# Patient Record
Sex: MALE | Race: WHITE | Employment: FULL TIME | URBAN - METROPOLITAN AREA
[De-identification: names, ages, dates, MRNs, and addresses within clinical notes are randomized per-mention and may not be internally consistent; named-entity substitution may affect disease eponyms.]

---

## 2019-09-30 ENCOUNTER — OFFICE VISIT (OUTPATIENT)
Dept: FAMILY MEDICINE CLINIC | Age: 69
End: 2019-09-30
Payer: COMMERCIAL

## 2019-09-30 VITALS
WEIGHT: 233.8 LBS | HEIGHT: 70 IN | BODY MASS INDEX: 33.47 KG/M2 | OXYGEN SATURATION: 95 % | RESPIRATION RATE: 14 BRPM | DIASTOLIC BLOOD PRESSURE: 88 MMHG | SYSTOLIC BLOOD PRESSURE: 132 MMHG | HEART RATE: 94 BPM | TEMPERATURE: 98 F

## 2019-09-30 DIAGNOSIS — Z00.00 PREVENTATIVE HEALTH CARE: Primary | ICD-10-CM

## 2019-09-30 DIAGNOSIS — K64.9 BLEEDING HEMORRHOID: ICD-10-CM

## 2019-09-30 DIAGNOSIS — E34.9 TESTOSTERONE DEFICIENCY: ICD-10-CM

## 2019-09-30 DIAGNOSIS — Z00.00 HEALTH CARE MAINTENANCE: ICD-10-CM

## 2019-09-30 DIAGNOSIS — Z12.5 PROSTATE CANCER SCREENING: ICD-10-CM

## 2019-09-30 DIAGNOSIS — E78.00 PURE HYPERCHOLESTEROLEMIA: ICD-10-CM

## 2019-09-30 DIAGNOSIS — N52.9 ERECTILE DYSFUNCTION, UNSPECIFIED ERECTILE DYSFUNCTION TYPE: ICD-10-CM

## 2019-09-30 DIAGNOSIS — Z12.11 COLON CANCER SCREENING: ICD-10-CM

## 2019-09-30 DIAGNOSIS — R73.9 HYPERGLYCEMIA: ICD-10-CM

## 2019-09-30 DIAGNOSIS — Z11.59 ENCOUNTER FOR HEPATITIS C SCREENING TEST FOR LOW RISK PATIENT: ICD-10-CM

## 2019-09-30 PROCEDURE — 99202 OFFICE O/P NEW SF 15 MIN: CPT | Performed by: INTERNAL MEDICINE

## 2019-09-30 RX ORDER — HYDROCORTISONE ACETATE 25 MG/1
25 SUPPOSITORY RECTAL 2 TIMES DAILY PRN
Qty: 12 SUPPOSITORY | Refills: 3 | Status: SHIPPED | OUTPATIENT
Start: 2019-09-30 | End: 2020-09-29

## 2019-09-30 RX ORDER — SILDENAFIL 100 MG/1
100 TABLET, FILM COATED ORAL PRN
Qty: 30 TABLET | Refills: 11 | Status: SHIPPED | OUTPATIENT
Start: 2019-09-30 | End: 2020-09-29

## 2019-09-30 ASSESSMENT — ENCOUNTER SYMPTOMS
TROUBLE SWALLOWING: 0
NAUSEA: 0
CHOKING: 0
VOMITING: 0
SHORTNESS OF BREATH: 0
PHOTOPHOBIA: 0
VOICE CHANGE: 0

## 2019-09-30 ASSESSMENT — PATIENT HEALTH QUESTIONNAIRE - PHQ9
SUM OF ALL RESPONSES TO PHQ9 QUESTIONS 1 & 2: 0
SUM OF ALL RESPONSES TO PHQ QUESTIONS 1-9: 0
1. LITTLE INTEREST OR PLEASURE IN DOING THINGS: 0
2. FEELING DOWN, DEPRESSED OR HOPELESS: 0
SUM OF ALL RESPONSES TO PHQ QUESTIONS 1-9: 0

## 2019-10-05 DIAGNOSIS — E34.9 TESTOSTERONE DEFICIENCY: ICD-10-CM

## 2019-10-05 DIAGNOSIS — R73.9 HYPERGLYCEMIA: ICD-10-CM

## 2019-10-05 DIAGNOSIS — E78.00 PURE HYPERCHOLESTEROLEMIA: ICD-10-CM

## 2019-10-05 DIAGNOSIS — Z11.59 ENCOUNTER FOR HEPATITIS C SCREENING TEST FOR LOW RISK PATIENT: ICD-10-CM

## 2019-10-05 DIAGNOSIS — Z12.5 PROSTATE CANCER SCREENING: ICD-10-CM

## 2019-10-05 DIAGNOSIS — Z00.00 PREVENTATIVE HEALTH CARE: ICD-10-CM

## 2019-10-05 LAB
ALBUMIN SERPL-MCNC: 4.5 G/DL (ref 3.5–4.6)
ALP BLD-CCNC: 81 U/L (ref 35–104)
ALT SERPL-CCNC: 35 U/L (ref 0–41)
ANION GAP SERPL CALCULATED.3IONS-SCNC: 16 MEQ/L (ref 9–15)
AST SERPL-CCNC: 22 U/L (ref 0–40)
BASOPHILS ABSOLUTE: 0 K/UL (ref 0–0.2)
BASOPHILS RELATIVE PERCENT: 0.3 %
BILIRUB SERPL-MCNC: 0.7 MG/DL (ref 0.2–0.7)
BUN BLDV-MCNC: 13 MG/DL (ref 8–23)
CALCIUM SERPL-MCNC: 10.1 MG/DL (ref 8.5–9.9)
CHLORIDE BLD-SCNC: 102 MEQ/L (ref 95–107)
CHOLESTEROL, TOTAL: 229 MG/DL (ref 0–199)
CO2: 21 MEQ/L (ref 20–31)
CREAT SERPL-MCNC: 0.87 MG/DL (ref 0.7–1.2)
EOSINOPHILS ABSOLUTE: 0.1 K/UL (ref 0–0.7)
EOSINOPHILS RELATIVE PERCENT: 2 %
GFR AFRICAN AMERICAN: >60
GFR NON-AFRICAN AMERICAN: >60
GLOBULIN: 3.3 G/DL (ref 2.3–3.5)
GLUCOSE BLD-MCNC: 211 MG/DL (ref 70–99)
HBA1C MFR BLD: 8.7 % (ref 4.8–5.9)
HCT VFR BLD CALC: 46.6 % (ref 42–52)
HDLC SERPL-MCNC: 36 MG/DL (ref 40–59)
HEMOGLOBIN: 17.3 G/DL (ref 14–18)
HEPATITIS C ANTIBODY INTERPRETATION: NORMAL
LDL CHOLESTEROL CALCULATED: 139 MG/DL (ref 0–129)
LYMPHOCYTES ABSOLUTE: 1.7 K/UL (ref 1–4.8)
LYMPHOCYTES RELATIVE PERCENT: 24.8 %
MCH RBC QN AUTO: 34.6 PG (ref 27–31.3)
MCHC RBC AUTO-ENTMCNC: 37.1 % (ref 33–37)
MCV RBC AUTO: 93.1 FL (ref 80–100)
MONOCYTES ABSOLUTE: 0.6 K/UL (ref 0.2–0.8)
MONOCYTES RELATIVE PERCENT: 8.6 %
NEUTROPHILS ABSOLUTE: 4.4 K/UL (ref 1.4–6.5)
NEUTROPHILS RELATIVE PERCENT: 64.3 %
PDW BLD-RTO: 13.4 % (ref 11.5–14.5)
PLATELET # BLD: 237 K/UL (ref 130–400)
PLATELET SLIDE REVIEW: NORMAL
POTASSIUM SERPL-SCNC: 4.3 MEQ/L (ref 3.4–4.9)
PROSTATE SPECIFIC ANTIGEN: 0.64 NG/ML (ref 0–5.4)
RBC # BLD: 5.01 M/UL (ref 4.7–6.1)
SODIUM BLD-SCNC: 139 MEQ/L (ref 135–144)
TOTAL PROTEIN: 7.8 G/DL (ref 6.3–8)
TRIGL SERPL-MCNC: 272 MG/DL (ref 0–150)
WBC # BLD: 6.8 K/UL (ref 4.8–10.8)

## 2019-10-07 LAB
SEX HORMONE BINDING GLOBULIN: 26 NMOL/L (ref 11–80)
TESTOSTERONE FREE PERCENT: 2.1 % (ref 1.6–2.9)
TESTOSTERONE FREE, CALC: 73 PG/ML (ref 47–244)
TESTOSTERONE TOTAL-MALE: 354 NG/DL (ref 300–720)

## 2019-10-08 ASSESSMENT — ENCOUNTER SYMPTOMS
RECTAL PAIN: 1
ANAL BLEEDING: 1

## 2019-12-07 ENCOUNTER — ANESTHESIA (OUTPATIENT)
Dept: OPERATING ROOM | Age: 69
End: 2019-12-07
Payer: COMMERCIAL

## 2019-12-07 ENCOUNTER — HOSPITAL ENCOUNTER (OUTPATIENT)
Age: 69
Setting detail: OUTPATIENT SURGERY
Discharge: HOME | End: 2019-12-07
Attending: SPECIALIST | Admitting: SPECIALIST
Payer: COMMERCIAL

## 2019-12-07 ENCOUNTER — ANESTHESIA EVENT (OUTPATIENT)
Dept: OPERATING ROOM | Age: 69
End: 2019-12-07
Payer: COMMERCIAL

## 2019-12-07 VITALS
OXYGEN SATURATION: 97 % | SYSTOLIC BLOOD PRESSURE: 158 MMHG | DIASTOLIC BLOOD PRESSURE: 92 MMHG | RESPIRATION RATE: 18 BRPM

## 2019-12-07 VITALS
SYSTOLIC BLOOD PRESSURE: 116 MMHG | OXYGEN SATURATION: 94 % | HEIGHT: 70 IN | TEMPERATURE: 98.2 F | HEART RATE: 78 BPM | BODY MASS INDEX: 32.21 KG/M2 | RESPIRATION RATE: 14 BRPM | WEIGHT: 225 LBS | DIASTOLIC BLOOD PRESSURE: 69 MMHG

## 2019-12-07 PROCEDURE — 6360000002 HC RX W HCPCS: Performed by: NURSE ANESTHETIST, CERTIFIED REGISTERED

## 2019-12-07 PROCEDURE — 2580000003 HC RX 258: Performed by: SPECIALIST

## 2019-12-07 PROCEDURE — 3700000000 HC ANESTHESIA ATTENDED CARE: Performed by: SPECIALIST

## 2019-12-07 PROCEDURE — 3609027000 HC COLONOSCOPY: Performed by: SPECIALIST

## 2019-12-07 PROCEDURE — 7100000011 HC PHASE II RECOVERY - ADDTL 15 MIN: Performed by: SPECIALIST

## 2019-12-07 PROCEDURE — 2500000003 HC RX 250 WO HCPCS: Performed by: NURSE ANESTHETIST, CERTIFIED REGISTERED

## 2019-12-07 PROCEDURE — 45380 COLONOSCOPY AND BIOPSY: CPT | Performed by: SPECIALIST

## 2019-12-07 PROCEDURE — 2709999900 HC NON-CHARGEABLE SUPPLY: Performed by: SPECIALIST

## 2019-12-07 PROCEDURE — 7100000010 HC PHASE II RECOVERY - FIRST 15 MIN: Performed by: SPECIALIST

## 2019-12-07 PROCEDURE — 45385 COLONOSCOPY W/LESION REMOVAL: CPT | Performed by: SPECIALIST

## 2019-12-07 PROCEDURE — 88305 TISSUE EXAM BY PATHOLOGIST: CPT

## 2019-12-07 PROCEDURE — 3700000001 HC ADD 15 MINUTES (ANESTHESIA): Performed by: SPECIALIST

## 2019-12-07 RX ORDER — LIDOCAINE HYDROCHLORIDE 10 MG/ML
INJECTION, SOLUTION INFILTRATION; PERINEURAL PRN
Status: DISCONTINUED | OUTPATIENT
Start: 2019-12-07 | End: 2019-12-07 | Stop reason: SDUPTHER

## 2019-12-07 RX ORDER — SODIUM CHLORIDE, SODIUM LACTATE, POTASSIUM CHLORIDE, CALCIUM CHLORIDE 600; 310; 30; 20 MG/100ML; MG/100ML; MG/100ML; MG/100ML
INJECTION, SOLUTION INTRAVENOUS CONTINUOUS
Status: DISCONTINUED | OUTPATIENT
Start: 2019-12-07 | End: 2019-12-07 | Stop reason: HOSPADM

## 2019-12-07 RX ORDER — ONDANSETRON 2 MG/ML
4 INJECTION INTRAMUSCULAR; INTRAVENOUS
Status: DISCONTINUED | OUTPATIENT
Start: 2019-12-07 | End: 2019-12-07 | Stop reason: HOSPADM

## 2019-12-07 RX ORDER — MAGNESIUM HYDROXIDE 1200 MG/15ML
LIQUID ORAL PRN
Status: DISCONTINUED | OUTPATIENT
Start: 2019-12-07 | End: 2019-12-07 | Stop reason: ALTCHOICE

## 2019-12-07 RX ORDER — PROPOFOL 10 MG/ML
INJECTION, EMULSION INTRAVENOUS PRN
Status: DISCONTINUED | OUTPATIENT
Start: 2019-12-07 | End: 2019-12-07 | Stop reason: SDUPTHER

## 2019-12-07 RX ADMIN — PROPOFOL 50 MG: 10 INJECTION, EMULSION INTRAVENOUS at 08:45

## 2019-12-07 RX ADMIN — PROPOFOL 100 MG: 10 INJECTION, EMULSION INTRAVENOUS at 08:30

## 2019-12-07 RX ADMIN — SODIUM CHLORIDE, POTASSIUM CHLORIDE, SODIUM LACTATE AND CALCIUM CHLORIDE: 600; 310; 30; 20 INJECTION, SOLUTION INTRAVENOUS at 07:47

## 2019-12-07 RX ADMIN — PROPOFOL 50 MG: 10 INJECTION, EMULSION INTRAVENOUS at 08:36

## 2019-12-07 RX ADMIN — PROPOFOL 50 MG: 10 INJECTION, EMULSION INTRAVENOUS at 08:40

## 2019-12-07 RX ADMIN — LIDOCAINE HYDROCHLORIDE 30 MG: 10 INJECTION, SOLUTION INFILTRATION; PERINEURAL at 08:30

## 2019-12-07 ASSESSMENT — PULMONARY FUNCTION TESTS
PIF_VALUE: 1

## 2019-12-07 ASSESSMENT — LIFESTYLE VARIABLES: SMOKING_STATUS: 1

## 2019-12-07 ASSESSMENT — PAIN SCALES - GENERAL
PAINLEVEL_OUTOF10: 0

## 2019-12-07 ASSESSMENT — PAIN - FUNCTIONAL ASSESSMENT: PAIN_FUNCTIONAL_ASSESSMENT: 0-10

## 2020-01-08 ENCOUNTER — TELEPHONE (OUTPATIENT)
Dept: PRIMARY CARE CLINIC | Age: 70
End: 2020-01-08

## 2022-04-28 ENCOUNTER — TELEPHONE (OUTPATIENT)
Dept: PRIMARY CARE CLINIC | Age: 72
End: 2022-04-28

## 2024-09-21 ENCOUNTER — HOSPITAL ENCOUNTER (OUTPATIENT)
Facility: HOSPITAL | Age: 74
Setting detail: OBSERVATION
End: 2024-09-21
Attending: EMERGENCY MEDICINE | Admitting: STUDENT IN AN ORGANIZED HEALTH CARE EDUCATION/TRAINING PROGRAM
Payer: MEDICARE

## 2024-09-21 DIAGNOSIS — K36 CHRONIC APPENDICITIS: ICD-10-CM

## 2024-09-21 DIAGNOSIS — K83.8 BILIARY SLUDGE: Primary | ICD-10-CM

## 2024-09-21 DIAGNOSIS — I10 HYPERTENSION, UNSPECIFIED TYPE: ICD-10-CM

## 2024-09-21 DIAGNOSIS — K40.20 NON-RECURRENT BILATERAL INGUINAL HERNIA WITHOUT OBSTRUCTION OR GANGRENE: ICD-10-CM

## 2024-09-21 DIAGNOSIS — E78.5 HYPERLIPIDEMIA, UNSPECIFIED HYPERLIPIDEMIA TYPE: ICD-10-CM

## 2024-09-21 DIAGNOSIS — K81.0 GANGRENOUS CHOLECYSTITIS: ICD-10-CM

## 2024-09-21 DIAGNOSIS — K35.30 ACUTE APPENDICITIS WITH LOCALIZED PERITONITIS, WITHOUT PERFORATION, ABSCESS, OR GANGRENE: ICD-10-CM

## 2024-09-21 PROCEDURE — 93010 ELECTROCARDIOGRAM REPORT: CPT | Performed by: EMERGENCY MEDICINE

## 2024-09-21 PROCEDURE — 99285 EMERGENCY DEPT VISIT HI MDM: CPT

## 2024-09-21 PROCEDURE — 99285 EMERGENCY DEPT VISIT HI MDM: CPT | Performed by: EMERGENCY MEDICINE

## 2024-09-21 ASSESSMENT — PAIN DESCRIPTION - PROGRESSION: CLINICAL_PROGRESSION: NOT CHANGED

## 2024-09-21 ASSESSMENT — PAIN DESCRIPTION - ONSET: ONSET: SUDDEN

## 2024-09-21 ASSESSMENT — COLUMBIA-SUICIDE SEVERITY RATING SCALE - C-SSRS
6. HAVE YOU EVER DONE ANYTHING, STARTED TO DO ANYTHING, OR PREPARED TO DO ANYTHING TO END YOUR LIFE?: NO
1. IN THE PAST MONTH, HAVE YOU WISHED YOU WERE DEAD OR WISHED YOU COULD GO TO SLEEP AND NOT WAKE UP?: NO
2. HAVE YOU ACTUALLY HAD ANY THOUGHTS OF KILLING YOURSELF?: NO

## 2024-09-21 ASSESSMENT — PAIN - FUNCTIONAL ASSESSMENT: PAIN_FUNCTIONAL_ASSESSMENT: 0-10

## 2024-09-21 ASSESSMENT — PAIN DESCRIPTION - LOCATION: LOCATION: ABDOMEN

## 2024-09-21 ASSESSMENT — PAIN DESCRIPTION - DESCRIPTORS
DESCRIPTORS: PRESSURE
DESCRIPTORS: PRESSURE

## 2024-09-21 ASSESSMENT — PAIN DESCRIPTION - FREQUENCY: FREQUENCY: CONSTANT/CONTINUOUS

## 2024-09-21 ASSESSMENT — PAIN SCALES - GENERAL: PAINLEVEL_OUTOF10: 8

## 2024-09-21 ASSESSMENT — PAIN DESCRIPTION - ORIENTATION: ORIENTATION: MID

## 2024-09-21 ASSESSMENT — PAIN DESCRIPTION - PAIN TYPE: TYPE: ACUTE PAIN

## 2024-09-22 ENCOUNTER — ANESTHESIA (OUTPATIENT)
Dept: OPERATING ROOM | Facility: HOSPITAL | Age: 74
End: 2024-09-22
Payer: MEDICARE

## 2024-09-22 ENCOUNTER — ANESTHESIA EVENT (OUTPATIENT)
Dept: OPERATING ROOM | Facility: HOSPITAL | Age: 74
End: 2024-09-22
Payer: MEDICARE

## 2024-09-22 ENCOUNTER — APPOINTMENT (OUTPATIENT)
Dept: RADIOLOGY | Facility: HOSPITAL | Age: 74
End: 2024-09-22
Payer: MEDICARE

## 2024-09-22 ENCOUNTER — APPOINTMENT (OUTPATIENT)
Dept: CARDIOLOGY | Facility: HOSPITAL | Age: 74
End: 2024-09-22
Payer: MEDICARE

## 2024-09-22 VITALS
BODY MASS INDEX: 29.67 KG/M2 | OXYGEN SATURATION: 96 % | WEIGHT: 207.23 LBS | HEIGHT: 70 IN | HEART RATE: 99 BPM | RESPIRATION RATE: 16 BRPM | SYSTOLIC BLOOD PRESSURE: 112 MMHG | DIASTOLIC BLOOD PRESSURE: 69 MMHG | TEMPERATURE: 96.6 F

## 2024-09-22 PROBLEM — K35.30 ACUTE APPENDICITIS WITH LOCALIZED PERITONITIS, WITHOUT PERFORATION, ABSCESS, OR GANGRENE: Status: ACTIVE | Noted: 2024-09-21

## 2024-09-22 PROBLEM — K83.8 BILIARY SLUDGE: Status: ACTIVE | Noted: 2024-09-22

## 2024-09-22 LAB
ALBUMIN SERPL BCP-MCNC: 4.3 G/DL (ref 3.4–5)
ALBUMIN SERPL BCP-MCNC: 4.3 G/DL (ref 3.4–5)
ALP SERPL-CCNC: 78 U/L (ref 33–136)
ALT SERPL W P-5'-P-CCNC: 18 U/L (ref 10–52)
ANION GAP SERPL CALC-SCNC: 14 MMOL/L (ref 10–20)
ANION GAP SERPL CALC-SCNC: 14 MMOL/L (ref 10–20)
APPEARANCE UR: CLEAR
AST SERPL W P-5'-P-CCNC: 16 U/L (ref 9–39)
BASOPHILS # BLD AUTO: 0.03 X10*3/UL (ref 0–0.1)
BASOPHILS # BLD AUTO: 0.05 X10*3/UL (ref 0–0.1)
BASOPHILS NFR BLD AUTO: 0.2 %
BASOPHILS NFR BLD AUTO: 0.5 %
BILIRUB SERPL-MCNC: 0.7 MG/DL (ref 0–1.2)
BILIRUB UR STRIP.AUTO-MCNC: NEGATIVE MG/DL
BUN SERPL-MCNC: 14 MG/DL (ref 6–23)
BUN SERPL-MCNC: 16 MG/DL (ref 6–23)
CALCIUM SERPL-MCNC: 9.4 MG/DL (ref 8.6–10.3)
CALCIUM SERPL-MCNC: 9.4 MG/DL (ref 8.6–10.3)
CARDIAC TROPONIN I PNL SERPL HS: 6 NG/L (ref 0–20)
CARDIAC TROPONIN I PNL SERPL HS: 7 NG/L (ref 0–20)
CHLORIDE SERPL-SCNC: 103 MMOL/L (ref 98–107)
CHLORIDE SERPL-SCNC: 106 MMOL/L (ref 98–107)
CHOLEST SERPL-MCNC: 210 MG/DL (ref 0–199)
CHOLESTEROL/HDL RATIO: 5.6
CO2 SERPL-SCNC: 21 MMOL/L (ref 21–32)
CO2 SERPL-SCNC: 25 MMOL/L (ref 21–32)
COLOR UR: ABNORMAL
CREAT SERPL-MCNC: 0.87 MG/DL (ref 0.5–1.3)
CREAT SERPL-MCNC: 0.87 MG/DL (ref 0.5–1.3)
EGFRCR SERPLBLD CKD-EPI 2021: >90 ML/MIN/1.73M*2
EGFRCR SERPLBLD CKD-EPI 2021: >90 ML/MIN/1.73M*2
EOSINOPHIL # BLD AUTO: 0.03 X10*3/UL (ref 0–0.4)
EOSINOPHIL # BLD AUTO: 0.33 X10*3/UL (ref 0–0.4)
EOSINOPHIL NFR BLD AUTO: 0.2 %
EOSINOPHIL NFR BLD AUTO: 3.4 %
ERYTHROCYTE [DISTWIDTH] IN BLOOD BY AUTOMATED COUNT: 13 % (ref 11.5–14.5)
ERYTHROCYTE [DISTWIDTH] IN BLOOD BY AUTOMATED COUNT: 13.2 % (ref 11.5–14.5)
ERYTHROCYTE [DISTWIDTH] IN BLOOD BY AUTOMATED COUNT: 13.4 % (ref 11.5–14.5)
ERYTHROCYTE [DISTWIDTH] IN BLOOD BY AUTOMATED COUNT: 13.5 % (ref 11.5–14.5)
EST. AVERAGE GLUCOSE BLD GHB EST-MCNC: 157 MG/DL
GLUCOSE BLD MANUAL STRIP-MCNC: 198 MG/DL (ref 74–99)
GLUCOSE SERPL-MCNC: 175 MG/DL (ref 74–99)
GLUCOSE SERPL-MCNC: 182 MG/DL (ref 74–99)
GLUCOSE UR STRIP.AUTO-MCNC: NORMAL MG/DL
GRAM STN SPEC: NORMAL
GRAM STN SPEC: NORMAL
HBA1C MFR BLD: 7.1 %
HCT VFR BLD AUTO: 46.5 % (ref 41–52)
HCT VFR BLD AUTO: 47.1 % (ref 41–52)
HCT VFR BLD AUTO: 49.9 % (ref 41–52)
HCT VFR BLD AUTO: 50.2 % (ref 41–52)
HDLC SERPL-MCNC: 37.6 MG/DL
HGB BLD-MCNC: 15.2 G/DL (ref 13.5–17.5)
HGB BLD-MCNC: 15.5 G/DL (ref 13.5–17.5)
HGB BLD-MCNC: 16.4 G/DL (ref 13.5–17.5)
HGB BLD-MCNC: 16.7 G/DL (ref 13.5–17.5)
HOLD SPECIMEN: NORMAL
HOLD SPECIMEN: NORMAL
IMM GRANULOCYTES # BLD AUTO: 0.03 X10*3/UL (ref 0–0.5)
IMM GRANULOCYTES # BLD AUTO: 0.07 X10*3/UL (ref 0–0.5)
IMM GRANULOCYTES NFR BLD AUTO: 0.3 % (ref 0–0.9)
IMM GRANULOCYTES NFR BLD AUTO: 0.5 % (ref 0–0.9)
KETONES UR STRIP.AUTO-MCNC: NEGATIVE MG/DL
LACTATE SERPL-SCNC: 1.4 MMOL/L (ref 0.4–2)
LDLC SERPL CALC-MCNC: 152 MG/DL
LEUKOCYTE ESTERASE UR QL STRIP.AUTO: NEGATIVE
LIPASE SERPL-CCNC: 37 U/L (ref 9–82)
LYMPHOCYTES # BLD AUTO: 1.17 X10*3/UL (ref 0.8–3)
LYMPHOCYTES # BLD AUTO: 2.55 X10*3/UL (ref 0.8–3)
LYMPHOCYTES NFR BLD AUTO: 26.4 %
LYMPHOCYTES NFR BLD AUTO: 8 %
MAGNESIUM SERPL-MCNC: 1.77 MG/DL (ref 1.6–2.4)
MAGNESIUM SERPL-MCNC: 1.88 MG/DL (ref 1.6–2.4)
MCH RBC QN AUTO: 29.4 PG (ref 26–34)
MCH RBC QN AUTO: 29.7 PG (ref 26–34)
MCH RBC QN AUTO: 29.8 PG (ref 26–34)
MCH RBC QN AUTO: 30 PG (ref 26–34)
MCHC RBC AUTO-ENTMCNC: 32.7 G/DL (ref 32–36)
MCHC RBC AUTO-ENTMCNC: 32.7 G/DL (ref 32–36)
MCHC RBC AUTO-ENTMCNC: 32.9 G/DL (ref 32–36)
MCHC RBC AUTO-ENTMCNC: 33.5 G/DL (ref 32–36)
MCV RBC AUTO: 89 FL (ref 80–100)
MCV RBC AUTO: 90 FL (ref 80–100)
MCV RBC AUTO: 91 FL (ref 80–100)
MCV RBC AUTO: 91 FL (ref 80–100)
MONOCYTES # BLD AUTO: 0.72 X10*3/UL (ref 0.05–0.8)
MONOCYTES # BLD AUTO: 0.9 X10*3/UL (ref 0.05–0.8)
MONOCYTES NFR BLD AUTO: 4.9 %
MONOCYTES NFR BLD AUTO: 9.3 %
NEUTROPHILS # BLD AUTO: 12.62 X10*3/UL (ref 1.6–5.5)
NEUTROPHILS # BLD AUTO: 5.79 X10*3/UL (ref 1.6–5.5)
NEUTROPHILS NFR BLD AUTO: 60.1 %
NEUTROPHILS NFR BLD AUTO: 86.2 %
NITRITE UR QL STRIP.AUTO: NEGATIVE
NON HDL CHOLESTEROL: 172 MG/DL (ref 0–149)
NRBC BLD-RTO: 0 /100 WBCS (ref 0–0)
PH UR STRIP.AUTO: 6 [PH]
PHOSPHATE SERPL-MCNC: 2.8 MG/DL (ref 2.5–4.9)
PLATELET # BLD AUTO: 183 X10*3/UL (ref 150–450)
PLATELET # BLD AUTO: 188 X10*3/UL (ref 150–450)
PLATELET # BLD AUTO: 213 X10*3/UL (ref 150–450)
PLATELET # BLD AUTO: 220 X10*3/UL (ref 150–450)
POTASSIUM SERPL-SCNC: 4.1 MMOL/L (ref 3.5–5.3)
POTASSIUM SERPL-SCNC: 4.2 MMOL/L (ref 3.5–5.3)
PROT SERPL-MCNC: 7.6 G/DL (ref 6.4–8.2)
PROT UR STRIP.AUTO-MCNC: NEGATIVE MG/DL
RBC # BLD AUTO: 5.1 X10*6/UL (ref 4.5–5.9)
RBC # BLD AUTO: 5.17 X10*6/UL (ref 4.5–5.9)
RBC # BLD AUTO: 5.58 X10*6/UL (ref 4.5–5.9)
RBC # BLD AUTO: 5.62 X10*6/UL (ref 4.5–5.9)
RBC # UR STRIP.AUTO: NEGATIVE /UL
SODIUM SERPL-SCNC: 137 MMOL/L (ref 136–145)
SODIUM SERPL-SCNC: 138 MMOL/L (ref 136–145)
SP GR UR STRIP.AUTO: 1.04
TRIGL SERPL-MCNC: 100 MG/DL (ref 0–149)
UROBILINOGEN UR STRIP.AUTO-MCNC: ABNORMAL MG/DL
VLDL: 20 MG/DL (ref 0–40)
WBC # BLD AUTO: 14.6 X10*3/UL (ref 4.4–11.3)
WBC # BLD AUTO: 18.8 X10*3/UL (ref 4.4–11.3)
WBC # BLD AUTO: 18.9 X10*3/UL (ref 4.4–11.3)
WBC # BLD AUTO: 9.7 X10*3/UL (ref 4.4–11.3)

## 2024-09-22 PROCEDURE — 74174 CTA ABD&PLVS W/CONTRAST: CPT | Performed by: RADIOLOGY

## 2024-09-22 PROCEDURE — 84484 ASSAY OF TROPONIN QUANT: CPT

## 2024-09-22 PROCEDURE — 93005 ELECTROCARDIOGRAM TRACING: CPT

## 2024-09-22 PROCEDURE — 47562 LAPAROSCOPIC CHOLECYSTECTOMY: CPT | Performed by: SURGERY

## 2024-09-22 PROCEDURE — 83735 ASSAY OF MAGNESIUM: CPT

## 2024-09-22 PROCEDURE — 3600000004 HC OR TIME - INITIAL BASE CHARGE - PROCEDURE LEVEL FOUR: Performed by: SURGERY

## 2024-09-22 PROCEDURE — G0378 HOSPITAL OBSERVATION PER HR: HCPCS

## 2024-09-22 PROCEDURE — 2500000004 HC RX 250 GENERAL PHARMACY W/ HCPCS (ALT 636 FOR OP/ED)

## 2024-09-22 PROCEDURE — 83690 ASSAY OF LIPASE: CPT

## 2024-09-22 PROCEDURE — 96375 TX/PRO/DX INJ NEW DRUG ADDON: CPT

## 2024-09-22 PROCEDURE — 85025 COMPLETE CBC W/AUTO DIFF WBC: CPT

## 2024-09-22 PROCEDURE — 3700000001 HC GENERAL ANESTHESIA TIME - INITIAL BASE CHARGE: Performed by: SURGERY

## 2024-09-22 PROCEDURE — 80053 COMPREHEN METABOLIC PANEL: CPT

## 2024-09-22 PROCEDURE — 44970 LAPAROSCOPY APPENDECTOMY: CPT | Performed by: SURGERY

## 2024-09-22 PROCEDURE — 2500000005 HC RX 250 GENERAL PHARMACY W/O HCPCS: Performed by: ANESTHESIOLOGY

## 2024-09-22 PROCEDURE — 87205 SMEAR GRAM STAIN: CPT | Mod: STJLAB | Performed by: SURGERY

## 2024-09-22 PROCEDURE — 74177 CT ABD & PELVIS W/CONTRAST: CPT | Performed by: RADIOLOGY

## 2024-09-22 PROCEDURE — 88304 TISSUE EXAM BY PATHOLOGIST: CPT | Mod: TC,STJLAB | Performed by: SURGERY

## 2024-09-22 PROCEDURE — 71275 CT ANGIOGRAPHY CHEST: CPT | Performed by: RADIOLOGY

## 2024-09-22 PROCEDURE — 2500000004 HC RX 250 GENERAL PHARMACY W/ HCPCS (ALT 636 FOR OP/ED): Performed by: STUDENT IN AN ORGANIZED HEALTH CARE EDUCATION/TRAINING PROGRAM

## 2024-09-22 PROCEDURE — 2500000004 HC RX 250 GENERAL PHARMACY W/ HCPCS (ALT 636 FOR OP/ED): Mod: JZ | Performed by: SURGERY

## 2024-09-22 PROCEDURE — 2550000001 HC RX 255 CONTRASTS: Performed by: EMERGENCY MEDICINE

## 2024-09-22 PROCEDURE — 2500000004 HC RX 250 GENERAL PHARMACY W/ HCPCS (ALT 636 FOR OP/ED): Performed by: EMERGENCY MEDICINE

## 2024-09-22 PROCEDURE — 2500000004 HC RX 250 GENERAL PHARMACY W/ HCPCS (ALT 636 FOR OP/ED): Performed by: ANESTHESIOLOGY

## 2024-09-22 PROCEDURE — 3700000002 HC GENERAL ANESTHESIA TIME - EACH INCREMENTAL 1 MINUTE: Performed by: SURGERY

## 2024-09-22 PROCEDURE — 7100000002 HC RECOVERY ROOM TIME - EACH INCREMENTAL 1 MINUTE: Performed by: SURGERY

## 2024-09-22 PROCEDURE — 0DTJ4ZZ RESECTION OF APPENDIX, PERCUTANEOUS ENDOSCOPIC APPROACH: ICD-10-PCS | Performed by: SURGERY

## 2024-09-22 PROCEDURE — 99100 ANES PT EXTEME AGE<1 YR&>70: CPT | Performed by: ANESTHESIOLOGY

## 2024-09-22 PROCEDURE — 83605 ASSAY OF LACTIC ACID: CPT

## 2024-09-22 PROCEDURE — 80061 LIPID PANEL: CPT

## 2024-09-22 PROCEDURE — 74177 CT ABD & PELVIS W/CONTRAST: CPT

## 2024-09-22 PROCEDURE — 3600000009 HC OR TIME - EACH INCREMENTAL 1 MINUTE - PROCEDURE LEVEL FOUR: Performed by: SURGERY

## 2024-09-22 PROCEDURE — 2500000001 HC RX 250 WO HCPCS SELF ADMINISTERED DRUGS (ALT 637 FOR MEDICARE OP): Performed by: SURGERY

## 2024-09-22 PROCEDURE — 2500000004 HC RX 250 GENERAL PHARMACY W/ HCPCS (ALT 636 FOR OP/ED): Performed by: SURGERY

## 2024-09-22 PROCEDURE — 96376 TX/PRO/DX INJ SAME DRUG ADON: CPT

## 2024-09-22 PROCEDURE — 96361 HYDRATE IV INFUSION ADD-ON: CPT

## 2024-09-22 PROCEDURE — 99223 1ST HOSP IP/OBS HIGH 75: CPT

## 2024-09-22 PROCEDURE — 85027 COMPLETE CBC AUTOMATED: CPT | Performed by: SURGERY

## 2024-09-22 PROCEDURE — 36415 COLL VENOUS BLD VENIPUNCTURE: CPT

## 2024-09-22 PROCEDURE — 2720000007 HC OR 272 NO HCPCS: Performed by: SURGERY

## 2024-09-22 PROCEDURE — 82947 ASSAY GLUCOSE BLOOD QUANT: CPT

## 2024-09-22 PROCEDURE — 81003 URINALYSIS AUTO W/O SCOPE: CPT

## 2024-09-22 PROCEDURE — 2780000003 HC OR 278 NO HCPCS: Performed by: SURGERY

## 2024-09-22 PROCEDURE — 85027 COMPLETE CBC AUTOMATED: CPT

## 2024-09-22 PROCEDURE — 0FT44ZZ RESECTION OF GALLBLADDER, PERCUTANEOUS ENDOSCOPIC APPROACH: ICD-10-PCS | Performed by: SURGERY

## 2024-09-22 PROCEDURE — 2500000005 HC RX 250 GENERAL PHARMACY W/O HCPCS: Performed by: NURSE ANESTHETIST, CERTIFIED REGISTERED

## 2024-09-22 PROCEDURE — 83036 HEMOGLOBIN GLYCOSYLATED A1C: CPT | Mod: STJLAB

## 2024-09-22 PROCEDURE — 87070 CULTURE OTHR SPECIMN AEROBIC: CPT | Mod: STJLAB | Performed by: SURGERY

## 2024-09-22 PROCEDURE — A47562 PR LAP,CHOLECYSTECTOMY: Performed by: ANESTHESIOLOGY

## 2024-09-22 PROCEDURE — 80069 RENAL FUNCTION PANEL: CPT | Mod: CCI

## 2024-09-22 PROCEDURE — 2500000004 HC RX 250 GENERAL PHARMACY W/ HCPCS (ALT 636 FOR OP/ED): Performed by: NURSE ANESTHETIST, CERTIFIED REGISTERED

## 2024-09-22 PROCEDURE — 2550000001 HC RX 255 CONTRASTS: Performed by: STUDENT IN AN ORGANIZED HEALTH CARE EDUCATION/TRAINING PROGRAM

## 2024-09-22 PROCEDURE — 7100000001 HC RECOVERY ROOM TIME - INITIAL BASE CHARGE: Performed by: SURGERY

## 2024-09-22 PROCEDURE — 88304 TISSUE EXAM BY PATHOLOGIST: CPT | Performed by: STUDENT IN AN ORGANIZED HEALTH CARE EDUCATION/TRAINING PROGRAM

## 2024-09-22 PROCEDURE — A47562 PR LAP,CHOLECYSTECTOMY: Performed by: NURSE ANESTHETIST, CERTIFIED REGISTERED

## 2024-09-22 PROCEDURE — 2500000001 HC RX 250 WO HCPCS SELF ADMINISTERED DRUGS (ALT 637 FOR MEDICARE OP)

## 2024-09-22 PROCEDURE — 71275 CT ANGIOGRAPHY CHEST: CPT

## 2024-09-22 RX ORDER — ROCURONIUM BROMIDE 10 MG/ML
INJECTION, SOLUTION INTRAVENOUS AS NEEDED
Status: DISCONTINUED | OUTPATIENT
Start: 2024-09-22 | End: 2024-09-22

## 2024-09-22 RX ORDER — MORPHINE SULFATE 4 MG/ML
4 INJECTION, SOLUTION INTRAMUSCULAR; INTRAVENOUS ONCE
Status: COMPLETED | OUTPATIENT
Start: 2024-09-22 | End: 2024-09-22

## 2024-09-22 RX ORDER — HYDRALAZINE HYDROCHLORIDE 20 MG/ML
5 INJECTION INTRAMUSCULAR; INTRAVENOUS EVERY 30 MIN PRN
Status: DISCONTINUED | OUTPATIENT
Start: 2024-09-22 | End: 2024-09-22 | Stop reason: HOSPADM

## 2024-09-22 RX ORDER — SODIUM CHLORIDE, SODIUM LACTATE, POTASSIUM CHLORIDE, CALCIUM CHLORIDE 600; 310; 30; 20 MG/100ML; MG/100ML; MG/100ML; MG/100ML
100 INJECTION, SOLUTION INTRAVENOUS CONTINUOUS
Status: DISCONTINUED | OUTPATIENT
Start: 2024-09-22 | End: 2024-09-23

## 2024-09-22 RX ORDER — ENOXAPARIN SODIUM 100 MG/ML
40 INJECTION SUBCUTANEOUS EVERY 24 HOURS
Status: DISCONTINUED | OUTPATIENT
Start: 2024-09-22 | End: 2024-09-24

## 2024-09-22 RX ORDER — ONDANSETRON HYDROCHLORIDE 2 MG/ML
4 INJECTION, SOLUTION INTRAVENOUS EVERY 4 HOURS PRN
Status: DISCONTINUED | OUTPATIENT
Start: 2024-09-22 | End: 2024-09-26 | Stop reason: HOSPADM

## 2024-09-22 RX ORDER — OXYCODONE HYDROCHLORIDE 10 MG/1
10 TABLET ORAL EVERY 4 HOURS PRN
Status: DISCONTINUED | OUTPATIENT
Start: 2024-09-22 | End: 2024-09-22 | Stop reason: HOSPADM

## 2024-09-22 RX ORDER — HYDROMORPHONE HYDROCHLORIDE 1 MG/ML
1 INJECTION, SOLUTION INTRAMUSCULAR; INTRAVENOUS; SUBCUTANEOUS EVERY 5 MIN PRN
Status: DISCONTINUED | OUTPATIENT
Start: 2024-09-22 | End: 2024-09-22 | Stop reason: HOSPADM

## 2024-09-22 RX ORDER — HYDROMORPHONE HYDROCHLORIDE 1 MG/ML
1 INJECTION, SOLUTION INTRAMUSCULAR; INTRAVENOUS; SUBCUTANEOUS EVERY 4 HOURS PRN
Status: DISCONTINUED | OUTPATIENT
Start: 2024-09-22 | End: 2024-09-26 | Stop reason: HOSPADM

## 2024-09-22 RX ORDER — TRAZODONE HYDROCHLORIDE 50 MG/1
50 TABLET ORAL NIGHTLY PRN
Status: DISCONTINUED | OUTPATIENT
Start: 2024-09-22 | End: 2024-09-26 | Stop reason: HOSPADM

## 2024-09-22 RX ORDER — ACETAMINOPHEN 325 MG/1
650 TABLET ORAL EVERY 4 HOURS PRN
Status: DISCONTINUED | OUTPATIENT
Start: 2024-09-22 | End: 2024-09-22 | Stop reason: HOSPADM

## 2024-09-22 RX ORDER — BUPIVACAINE HYDROCHLORIDE 5 MG/ML
INJECTION, SOLUTION EPIDURAL; INTRACAUDAL AS NEEDED
Status: DISCONTINUED | OUTPATIENT
Start: 2024-09-22 | End: 2024-09-22 | Stop reason: HOSPADM

## 2024-09-22 RX ORDER — OXYCODONE AND ACETAMINOPHEN 5; 325 MG/1; MG/1
1 TABLET ORAL EVERY 4 HOURS PRN
Status: DISCONTINUED | OUTPATIENT
Start: 2024-09-22 | End: 2024-09-22 | Stop reason: HOSPADM

## 2024-09-22 RX ORDER — ATORVASTATIN CALCIUM 40 MG/1
40 TABLET, FILM COATED ORAL NIGHTLY
Status: DISCONTINUED | OUTPATIENT
Start: 2024-09-22 | End: 2024-09-26 | Stop reason: HOSPADM

## 2024-09-22 RX ORDER — PHENYLEPHRINE HCL IN 0.9% NACL 1 MG/10 ML
SYRINGE (ML) INTRAVENOUS AS NEEDED
Status: DISCONTINUED | OUTPATIENT
Start: 2024-09-22 | End: 2024-09-22

## 2024-09-22 RX ORDER — FENTANYL CITRATE 50 UG/ML
INJECTION, SOLUTION INTRAMUSCULAR; INTRAVENOUS AS NEEDED
Status: DISCONTINUED | OUTPATIENT
Start: 2024-09-22 | End: 2024-09-22

## 2024-09-22 RX ORDER — ONDANSETRON HYDROCHLORIDE 2 MG/ML
4 INJECTION, SOLUTION INTRAVENOUS ONCE
Status: COMPLETED | OUTPATIENT
Start: 2024-09-22 | End: 2024-09-22

## 2024-09-22 RX ORDER — SODIUM CHLORIDE, SODIUM LACTATE, POTASSIUM CHLORIDE, CALCIUM CHLORIDE 600; 310; 30; 20 MG/100ML; MG/100ML; MG/100ML; MG/100ML
125 INJECTION, SOLUTION INTRAVENOUS CONTINUOUS
Status: DISCONTINUED | OUTPATIENT
Start: 2024-09-22 | End: 2024-09-24

## 2024-09-22 RX ORDER — ACETAMINOPHEN 325 MG/1
975 TABLET ORAL ONCE
Status: DISCONTINUED | OUTPATIENT
Start: 2024-09-22 | End: 2024-09-22 | Stop reason: HOSPADM

## 2024-09-22 RX ORDER — HYDROMORPHONE HYDROCHLORIDE 1 MG/ML
INJECTION, SOLUTION INTRAMUSCULAR; INTRAVENOUS; SUBCUTANEOUS AS NEEDED
Status: DISCONTINUED | OUTPATIENT
Start: 2024-09-22 | End: 2024-09-22

## 2024-09-22 RX ORDER — DEXAMETHASONE SODIUM PHOSPHATE 10 MG/ML
INJECTION INTRAMUSCULAR; INTRAVENOUS AS NEEDED
Status: DISCONTINUED | OUTPATIENT
Start: 2024-09-22 | End: 2024-09-22

## 2024-09-22 RX ORDER — MIDAZOLAM HYDROCHLORIDE 1 MG/ML
1 INJECTION, SOLUTION INTRAMUSCULAR; INTRAVENOUS ONCE AS NEEDED
Status: COMPLETED | OUTPATIENT
Start: 2024-09-22 | End: 2024-09-22

## 2024-09-22 RX ORDER — PROPOFOL 10 MG/ML
INJECTION, EMULSION INTRAVENOUS AS NEEDED
Status: DISCONTINUED | OUTPATIENT
Start: 2024-09-22 | End: 2024-09-22

## 2024-09-22 RX ORDER — LIDOCAINE HYDROCHLORIDE 20 MG/ML
INJECTION, SOLUTION INFILTRATION; PERINEURAL AS NEEDED
Status: DISCONTINUED | OUTPATIENT
Start: 2024-09-22 | End: 2024-09-22

## 2024-09-22 RX ORDER — HYDROMORPHONE HYDROCHLORIDE 1 MG/ML
1 INJECTION, SOLUTION INTRAMUSCULAR; INTRAVENOUS; SUBCUTANEOUS ONCE
Status: COMPLETED | OUTPATIENT
Start: 2024-09-22 | End: 2024-09-22

## 2024-09-22 RX ORDER — HYDROMORPHONE HYDROCHLORIDE 0.2 MG/ML
0.1 INJECTION INTRAMUSCULAR; INTRAVENOUS; SUBCUTANEOUS EVERY 5 MIN PRN
Status: DISCONTINUED | OUTPATIENT
Start: 2024-09-22 | End: 2024-09-22 | Stop reason: HOSPADM

## 2024-09-22 RX ORDER — ALBUTEROL SULFATE 0.83 MG/ML
2.5 SOLUTION RESPIRATORY (INHALATION) ONCE AS NEEDED
Status: DISCONTINUED | OUTPATIENT
Start: 2024-09-22 | End: 2024-09-22 | Stop reason: HOSPADM

## 2024-09-22 RX ORDER — MIDAZOLAM HYDROCHLORIDE 1 MG/ML
INJECTION, SOLUTION INTRAMUSCULAR; INTRAVENOUS AS NEEDED
Status: DISCONTINUED | OUTPATIENT
Start: 2024-09-22 | End: 2024-09-22

## 2024-09-22 RX ORDER — ONDANSETRON HYDROCHLORIDE 2 MG/ML
INJECTION, SOLUTION INTRAVENOUS AS NEEDED
Status: DISCONTINUED | OUTPATIENT
Start: 2024-09-22 | End: 2024-09-22

## 2024-09-22 RX ORDER — ONDANSETRON HYDROCHLORIDE 2 MG/ML
4 INJECTION, SOLUTION INTRAVENOUS ONCE AS NEEDED
Status: COMPLETED | OUTPATIENT
Start: 2024-09-22 | End: 2024-09-22

## 2024-09-22 RX ADMIN — ONDANSETRON 4 MG: 2 INJECTION INTRAMUSCULAR; INTRAVENOUS at 00:15

## 2024-09-22 RX ADMIN — PIPERACILLIN SODIUM AND TAZOBACTAM SODIUM 3.38 G: 3; .375 INJECTION, SOLUTION INTRAVENOUS at 22:58

## 2024-09-22 RX ADMIN — PROPOFOL 30 MG: 10 INJECTION, EMULSION INTRAVENOUS at 12:53

## 2024-09-22 RX ADMIN — ATORVASTATIN CALCIUM 40 MG: 40 TABLET, FILM COATED ORAL at 21:57

## 2024-09-22 RX ADMIN — ONDANSETRON 4 MG: 2 INJECTION, SOLUTION INTRAMUSCULAR; INTRAVENOUS at 14:25

## 2024-09-22 RX ADMIN — HYDROMORPHONE HYDROCHLORIDE 1 MG: 1 INJECTION, SOLUTION INTRAMUSCULAR; INTRAVENOUS; SUBCUTANEOUS at 14:50

## 2024-09-22 RX ADMIN — ROCURONIUM BROMIDE 10 MG: 10 INJECTION INTRAVENOUS at 13:39

## 2024-09-22 RX ADMIN — PIPERACILLIN SODIUM AND TAZOBACTAM SODIUM 3.38 G: 3; .375 INJECTION, SOLUTION INTRAVENOUS at 12:41

## 2024-09-22 RX ADMIN — PROPOFOL 150 MG: 10 INJECTION, EMULSION INTRAVENOUS at 12:31

## 2024-09-22 RX ADMIN — LIDOCAINE HYDROCHLORIDE 60 MG: 20 INJECTION, SOLUTION INFILTRATION; PERINEURAL at 12:31

## 2024-09-22 RX ADMIN — FENTANYL CITRATE 50 MCG: 50 INJECTION, SOLUTION INTRAMUSCULAR; INTRAVENOUS at 14:25

## 2024-09-22 RX ADMIN — MORPHINE SULFATE 4 MG: 4 INJECTION, SOLUTION INTRAMUSCULAR; INTRAVENOUS at 00:31

## 2024-09-22 RX ADMIN — MIDAZOLAM 1 MG: 1 INJECTION INTRAMUSCULAR; INTRAVENOUS at 14:30

## 2024-09-22 RX ADMIN — PIPERACILLIN SODIUM AND TAZOBACTAM SODIUM 3.38 G: 3; .375 INJECTION, SOLUTION INTRAVENOUS at 16:45

## 2024-09-22 RX ADMIN — HYDROMORPHONE HYDROCHLORIDE 0.5 MG: 1 INJECTION, SOLUTION INTRAMUSCULAR; INTRAVENOUS; SUBCUTANEOUS at 09:37

## 2024-09-22 RX ADMIN — SODIUM CHLORIDE, POTASSIUM CHLORIDE, SODIUM LACTATE AND CALCIUM CHLORIDE 1000 ML: 600; 310; 30; 20 INJECTION, SOLUTION INTRAVENOUS at 00:15

## 2024-09-22 RX ADMIN — ONDANSETRON 4 MG: 2 INJECTION, SOLUTION INTRAMUSCULAR; INTRAVENOUS at 14:30

## 2024-09-22 RX ADMIN — SODIUM CHLORIDE, POTASSIUM CHLORIDE, SODIUM LACTATE AND CALCIUM CHLORIDE 100 ML/HR: 600; 310; 30; 20 INJECTION, SOLUTION INTRAVENOUS at 15:00

## 2024-09-22 RX ADMIN — IOHEXOL 90 ML: 350 INJECTION, SOLUTION INTRAVENOUS at 06:48

## 2024-09-22 RX ADMIN — DEXAMETHASONE SODIUM PHOSPHATE 4 MG: 10 INJECTION INTRAMUSCULAR; INTRAVENOUS at 13:04

## 2024-09-22 RX ADMIN — ROCURONIUM BROMIDE 50 MG: 10 INJECTION INTRAVENOUS at 12:32

## 2024-09-22 RX ADMIN — ONDANSETRON 4 MG: 2 INJECTION, SOLUTION INTRAMUSCULAR; INTRAVENOUS at 13:04

## 2024-09-22 RX ADMIN — PIPERACILLIN SODIUM AND TAZOBACTAM SODIUM 3.38 G: 3; .375 INJECTION, SOLUTION INTRAVENOUS at 08:19

## 2024-09-22 RX ADMIN — HYDROMORPHONE HYDROCHLORIDE 1 MG: 1 INJECTION, SOLUTION INTRAMUSCULAR; INTRAVENOUS; SUBCUTANEOUS at 01:44

## 2024-09-22 RX ADMIN — HYDROMORPHONE HYDROCHLORIDE 1 MG: 1 INJECTION, SOLUTION INTRAMUSCULAR; INTRAVENOUS; SUBCUTANEOUS at 14:41

## 2024-09-22 RX ADMIN — Medication 10 L/MIN: at 14:30

## 2024-09-22 RX ADMIN — HYDROMORPHONE HYDROCHLORIDE 0.5 MG: 1 INJECTION, SOLUTION INTRAMUSCULAR; INTRAVENOUS; SUBCUTANEOUS at 14:36

## 2024-09-22 RX ADMIN — SODIUM CHLORIDE, POTASSIUM CHLORIDE, SODIUM LACTATE AND CALCIUM CHLORIDE 125 ML/HR: 600; 310; 30; 20 INJECTION, SOLUTION INTRAVENOUS at 08:23

## 2024-09-22 RX ADMIN — HYDROMORPHONE HYDROCHLORIDE 1 MG: 1 INJECTION, SOLUTION INTRAMUSCULAR; INTRAVENOUS; SUBCUTANEOUS at 04:25

## 2024-09-22 RX ADMIN — SODIUM CHLORIDE, POTASSIUM CHLORIDE, SODIUM LACTATE AND CALCIUM CHLORIDE: 600; 310; 30; 20 INJECTION, SOLUTION INTRAVENOUS at 12:27

## 2024-09-22 RX ADMIN — Medication 200 MCG: at 12:47

## 2024-09-22 RX ADMIN — TRAZODONE HYDROCHLORIDE 50 MG: 50 TABLET ORAL at 22:58

## 2024-09-22 RX ADMIN — HYDROMORPHONE HYDROCHLORIDE 0.25 MG: 1 INJECTION, SOLUTION INTRAMUSCULAR; INTRAVENOUS; SUBCUTANEOUS at 13:25

## 2024-09-22 RX ADMIN — HYDROMORPHONE HYDROCHLORIDE 0.5 MG: 1 INJECTION, SOLUTION INTRAMUSCULAR; INTRAVENOUS; SUBCUTANEOUS at 12:31

## 2024-09-22 RX ADMIN — SUGAMMADEX 200 MG: 100 INJECTION, SOLUTION INTRAVENOUS at 14:08

## 2024-09-22 RX ADMIN — HYDROMORPHONE HYDROCHLORIDE 0.25 MG: 1 INJECTION, SOLUTION INTRAMUSCULAR; INTRAVENOUS; SUBCUTANEOUS at 13:15

## 2024-09-22 RX ADMIN — PROPOFOL 20 MG: 10 INJECTION, EMULSION INTRAVENOUS at 12:52

## 2024-09-22 RX ADMIN — IOHEXOL 75 ML: 350 INJECTION, SOLUTION INTRAVENOUS at 01:05

## 2024-09-22 SDOH — ECONOMIC STABILITY: FOOD INSECURITY: WITHIN THE PAST 12 MONTHS, YOU WORRIED THAT YOUR FOOD WOULD RUN OUT BEFORE YOU GOT MONEY TO BUY MORE.: NEVER TRUE

## 2024-09-22 SDOH — HEALTH STABILITY: PHYSICAL HEALTH: ON AVERAGE, HOW MANY DAYS PER WEEK DO YOU ENGAGE IN MODERATE TO STRENUOUS EXERCISE (LIKE A BRISK WALK)?: 2 DAYS

## 2024-09-22 SDOH — SOCIAL STABILITY: SOCIAL INSECURITY: DO YOU FEEL UNSAFE GOING BACK TO THE PLACE WHERE YOU ARE LIVING?: NO

## 2024-09-22 SDOH — HEALTH STABILITY: MENTAL HEALTH
HOW OFTEN DO YOU NEED TO HAVE SOMEONE HELP YOU WHEN YOU READ INSTRUCTIONS, PAMPHLETS, OR OTHER WRITTEN MATERIAL FROM YOUR DOCTOR OR PHARMACY?: NEVER

## 2024-09-22 SDOH — ECONOMIC STABILITY: FOOD INSECURITY: WITHIN THE PAST 12 MONTHS, THE FOOD YOU BOUGHT JUST DIDN'T LAST AND YOU DIDN'T HAVE MONEY TO GET MORE.: NEVER TRUE

## 2024-09-22 SDOH — HEALTH STABILITY: MENTAL HEALTH
STRESS IS WHEN SOMEONE FEELS TENSE, NERVOUS, ANXIOUS, OR CAN'T SLEEP AT NIGHT BECAUSE THEIR MIND IS TROUBLED. HOW STRESSED ARE YOU?: NOT AT ALL

## 2024-09-22 SDOH — HEALTH STABILITY: MENTAL HEALTH: CURRENT SMOKER: 1

## 2024-09-22 SDOH — SOCIAL STABILITY: SOCIAL NETWORK: ARE YOU MARRIED, WIDOWED, DIVORCED, SEPARATED, NEVER MARRIED, OR LIVING WITH A PARTNER?: MARRIED

## 2024-09-22 SDOH — SOCIAL STABILITY: SOCIAL INSECURITY: WITHIN THE LAST YEAR, HAVE YOU BEEN AFRAID OF YOUR PARTNER OR EX-PARTNER?: NO

## 2024-09-22 SDOH — SOCIAL STABILITY: SOCIAL INSECURITY
WITHIN THE LAST YEAR, HAVE YOU BEEN KICKED, HIT, SLAPPED, OR OTHERWISE PHYSICALLY HURT BY YOUR PARTNER OR EX-PARTNER?: NO

## 2024-09-22 SDOH — SOCIAL STABILITY: SOCIAL INSECURITY: WERE YOU ABLE TO COMPLETE ALL THE BEHAVIORAL HEALTH SCREENINGS?: YES

## 2024-09-22 SDOH — SOCIAL STABILITY: SOCIAL NETWORK
DO YOU BELONG TO ANY CLUBS OR ORGANIZATIONS SUCH AS CHURCH GROUPS UNIONS, FRATERNAL OR ATHLETIC GROUPS, OR SCHOOL GROUPS?: NO

## 2024-09-22 SDOH — SOCIAL STABILITY: SOCIAL INSECURITY: WITHIN THE LAST YEAR, HAVE YOU BEEN HUMILIATED OR EMOTIONALLY ABUSED IN OTHER WAYS BY YOUR PARTNER OR EX-PARTNER?: NO

## 2024-09-22 SDOH — SOCIAL STABILITY: SOCIAL NETWORK: HOW OFTEN DO YOU ATTENT MEETINGS OF THE CLUB OR ORGANIZATION YOU BELONG TO?: NEVER

## 2024-09-22 SDOH — SOCIAL STABILITY: SOCIAL INSECURITY: DO YOU FEEL ANYONE HAS EXPLOITED OR TAKEN ADVANTAGE OF YOU FINANCIALLY OR OF YOUR PERSONAL PROPERTY?: NO

## 2024-09-22 SDOH — SOCIAL STABILITY: SOCIAL INSECURITY: HAS ANYONE EVER THREATENED TO HURT YOUR FAMILY OR YOUR PETS?: NO

## 2024-09-22 SDOH — SOCIAL STABILITY: SOCIAL INSECURITY: ARE THERE ANY APPARENT SIGNS OF INJURIES/BEHAVIORS THAT COULD BE RELATED TO ABUSE/NEGLECT?: NO

## 2024-09-22 SDOH — SOCIAL STABILITY: SOCIAL NETWORK
IN A TYPICAL WEEK, HOW MANY TIMES DO YOU TALK ON THE PHONE WITH FAMILY, FRIENDS, OR NEIGHBORS?: MORE THAN THREE TIMES A WEEK

## 2024-09-22 SDOH — SOCIAL STABILITY: SOCIAL INSECURITY
WITHIN THE LAST YEAR, HAVE TO BEEN RAPED OR FORCED TO HAVE ANY KIND OF SEXUAL ACTIVITY BY YOUR PARTNER OR EX-PARTNER?: NO

## 2024-09-22 SDOH — SOCIAL STABILITY: SOCIAL INSECURITY: ABUSE: ADULT

## 2024-09-22 SDOH — ECONOMIC STABILITY: INCOME INSECURITY: IN THE PAST 12 MONTHS, HAS THE ELECTRIC, GAS, OIL, OR WATER COMPANY THREATENED TO SHUT OFF SERVICE IN YOUR HOME?: NO

## 2024-09-22 SDOH — SOCIAL STABILITY: SOCIAL NETWORK: HOW OFTEN DO YOU ATTEND CHURCH OR RELIGIOUS SERVICES?: MORE THAN 4 TIMES PER YEAR

## 2024-09-22 SDOH — SOCIAL STABILITY: SOCIAL INSECURITY: HAVE YOU HAD THOUGHTS OF HARMING ANYONE ELSE?: NO

## 2024-09-22 SDOH — SOCIAL STABILITY: SOCIAL INSECURITY: DOES ANYONE TRY TO KEEP YOU FROM HAVING/CONTACTING OTHER FRIENDS OR DOING THINGS OUTSIDE YOUR HOME?: NO

## 2024-09-22 SDOH — SOCIAL STABILITY: SOCIAL INSECURITY: ARE YOU OR HAVE YOU BEEN THREATENED OR ABUSED PHYSICALLY, EMOTIONALLY, OR SEXUALLY BY ANYONE?: NO

## 2024-09-22 SDOH — SOCIAL STABILITY: SOCIAL INSECURITY: HAVE YOU HAD ANY THOUGHTS OF HARMING ANYONE ELSE?: NO

## 2024-09-22 SDOH — SOCIAL STABILITY: SOCIAL NETWORK: HOW OFTEN DO YOU GET TOGETHER WITH FRIENDS OR RELATIVES?: ONCE A WEEK

## 2024-09-22 SDOH — HEALTH STABILITY: PHYSICAL HEALTH: ON AVERAGE, HOW MANY MINUTES DO YOU ENGAGE IN EXERCISE AT THIS LEVEL?: 30 MIN

## 2024-09-22 ASSESSMENT — PAIN SCALES - GENERAL
PAINLEVEL_OUTOF10: 0 - NO PAIN
PAINLEVEL_OUTOF10: 10 - WORST POSSIBLE PAIN
PAINLEVEL_OUTOF10: 3
PAINLEVEL_OUTOF10: 8
PAINLEVEL_OUTOF10: 10 - WORST POSSIBLE PAIN
PAINLEVEL_OUTOF10: 3
PAINLEVEL_OUTOF10: 10 - WORST POSSIBLE PAIN
PAINLEVEL_OUTOF10: 10 - WORST POSSIBLE PAIN
PAINLEVEL_OUTOF10: 0 - NO PAIN
PAINLEVEL_OUTOF10: 3
PAINLEVEL_OUTOF10: 0 - NO PAIN
PAINLEVEL_OUTOF10: 5 - MODERATE PAIN
PAINLEVEL_OUTOF10: 10 - WORST POSSIBLE PAIN
PAINLEVEL_OUTOF10: 8
PAINLEVEL_OUTOF10: 9
PAIN_LEVEL: 4
PAINLEVEL_OUTOF10: 10 - WORST POSSIBLE PAIN

## 2024-09-22 ASSESSMENT — LIFESTYLE VARIABLES
AUDIT-C TOTAL SCORE: 1
HOW OFTEN DURING THE LAST YEAR HAVE YOU BEEN UNABLE TO REMEMBER WHAT HAPPENED THE NIGHT BEFORE BECAUSE YOU HAD BEEN DRINKING: NEVER
HOW OFTEN DURING THE LAST YEAR HAVE YOU NEEDED AN ALCOHOLIC DRINK FIRST THING IN THE MORNING TO GET YOURSELF GOING AFTER A NIGHT OF HEAVY DRINKING: NEVER
HOW OFTEN DURING THE LAST YEAR HAVE YOU FAILED TO DO WHAT WAS NORMALLY EXPECTED FROM YOU BECAUSE OF DRINKING: NEVER
SKIP TO QUESTIONS 9-10: 1
HAVE YOU OR SOMEONE ELSE BEEN INJURED AS A RESULT OF YOUR DRINKING: NO
HOW OFTEN DURING THE LAST YEAR HAVE YOU HAD A FEELING OF GUILT OR REMORSE AFTER DRINKING: NEVER
HOW MANY STANDARD DRINKS CONTAINING ALCOHOL DO YOU HAVE ON A TYPICAL DAY: 1 OR 2
HOW OFTEN DURING THE LAST YEAR HAVE YOU FOUND THAT YOU WERE NOT ABLE TO STOP DRINKING ONCE YOU HAD STARTED: NEVER
HOW OFTEN DO YOU HAVE SIX OR MORE DRINKS ON ONE OCCASION: NEVER
PRESCIPTION_ABUSE_PAST_12_MONTHS: NO
SUBSTANCE_ABUSE_PAST_12_MONTHS: NO
HAS A RELATIVE, FRIEND, DOCTOR, OR ANOTHER HEALTH PROFESSIONAL EXPRESSED CONCERN ABOUT YOUR DRINKING OR SUGGESTED YOU CUT DOWN: NO
AUDIT TOTAL SCORE: 1
HOW OFTEN DO YOU HAVE A DRINK CONTAINING ALCOHOL: MONTHLY OR LESS

## 2024-09-22 ASSESSMENT — COGNITIVE AND FUNCTIONAL STATUS - GENERAL
PATIENT BASELINE BEDBOUND: NO
DAILY ACTIVITIY SCORE: 24
DAILY ACTIVITIY SCORE: 22
STANDING UP FROM CHAIR USING ARMS: A LITTLE
DAILY ACTIVITIY SCORE: 24
CLIMB 3 TO 5 STEPS WITH RAILING: A LITTLE
WALKING IN HOSPITAL ROOM: A LITTLE
MOBILITY SCORE: 24
DRESSING REGULAR UPPER BODY CLOTHING: A LITTLE
DRESSING REGULAR LOWER BODY CLOTHING: A LITTLE
MOVING TO AND FROM BED TO CHAIR: A LITTLE
MOBILITY SCORE: 20
MOBILITY SCORE: 24

## 2024-09-22 ASSESSMENT — ENCOUNTER SYMPTOMS
COUGH: 0
AGITATION: 0
WHEEZING: 0
VOMITING: 0
FEVER: 0
NAUSEA: 0
ABDOMINAL PAIN: 1
CONSTIPATION: 0
DIARRHEA: 0
SHORTNESS OF BREATH: 0
UNEXPECTED WEIGHT CHANGE: 0

## 2024-09-22 ASSESSMENT — PATIENT HEALTH QUESTIONNAIRE - PHQ9
1. LITTLE INTEREST OR PLEASURE IN DOING THINGS: NOT AT ALL
SUM OF ALL RESPONSES TO PHQ9 QUESTIONS 1 & 2: 0
2. FEELING DOWN, DEPRESSED OR HOPELESS: NOT AT ALL

## 2024-09-22 ASSESSMENT — ACTIVITIES OF DAILY LIVING (ADL)
PATIENT'S MEMORY ADEQUATE TO SAFELY COMPLETE DAILY ACTIVITIES?: YES
TOILETING: INDEPENDENT
HEARING - RIGHT EAR: FUNCTIONAL
WALKS IN HOME: INDEPENDENT
ADEQUATE_TO_COMPLETE_ADL: YES
ASSISTIVE_DEVICE: DENTURES UPPER
BATHING: INDEPENDENT
LACK_OF_TRANSPORTATION: NO
DRESSING YOURSELF: INDEPENDENT
JUDGMENT_ADEQUATE_SAFELY_COMPLETE_DAILY_ACTIVITIES: YES
HEARING - LEFT EAR: FUNCTIONAL
FEEDING YOURSELF: INDEPENDENT
GROOMING: INDEPENDENT

## 2024-09-22 ASSESSMENT — PAIN DESCRIPTION - LOCATION
LOCATION: ABDOMEN

## 2024-09-22 ASSESSMENT — PAIN DESCRIPTION - ORIENTATION: ORIENTATION: RIGHT

## 2024-09-22 ASSESSMENT — PAIN DESCRIPTION - DESCRIPTORS: DESCRIPTORS: SHARP

## 2024-09-22 NOTE — ANESTHESIA PROCEDURE NOTES
Airway  Date/Time: 9/22/2024 12:35 PM  Urgency: elective    Airway not difficult    Staffing  Performed: CRNA   Authorized by: Izzy Cardenas MD    Performed by: ODALYS Greenwood-ANNA  Patient location during procedure: OR    Indications and Patient Condition  Indications for airway management: anesthesia  Spontaneous ventilation: present  Sedation level: deep  Preoxygenated: yes  Patient position: sniffing  MILS maintained throughout  Mask difficulty assessment: 1 - vent by mask    Final Airway Details  Final airway type: endotracheal airway      Successful airway: ETT  Cuffed: yes   Successful intubation technique: direct laryngoscopy  Facilitating devices/methods: intubating stylet  Blade: Felipe  Blade size: #3  ETT size (mm): 7.0  Cormack-Lehane Classification: grade I - full view of glottis  Placement verified by: chest auscultation   Inital cuff pressure (cm H2O): 10  Cuff volume (mL): 5  Measured from: lips  ETT to lips (cm): 22  Number of attempts at approach: 1  Number of other approaches attempted: 0

## 2024-09-22 NOTE — CONSULTS
CARDIOLOGY SERVICE - Initial CONSULT    CVM Dr. Maame Shaw  [facility] [place]    PATIENT NAME: Brian Ayon  PATIENT MRN: [id number]  SERVICE DATE:  9/22/2024  SERVICE TIME: 1:36 PM    CONSULTANT: Maame Shaw MD  PRIMARY CARE PHYSICIAN: No Assigned PCP Generic Provider, MD  ATTENDING PHYSICIAN: Pancho Whitley DO      IMPRESSIONS:  Patient came with acute acute abdominal pain and is in the OR for appendectomy  History of ant STEMI [facility] treated with primary PCI to 2/2021  Remote PCI LAD [place] ~ 2010  PCI midLAD SNEHAL [place] 3.0x22 on 2/10/2021, on DAPT   Other CAD: osteal LM 25%, osteal LAD 50%, sub-branch of OM1 has osteal 80%, moderate distal dominant Circ , all management and close monitoring  Hypertension on Lisinopril 2.5 daily, Metoprolol tartrate 25 BID  Hyperlipidemia  on 2/7/2023 1, started Lipitor 80  Compliance, cardiac care since MRI  Card meds PTA:     RECOMMENDATIONS:  I have not seen obvious contraindication to proceeding with surgery especially that it is deemed urgent  From the cardiology point of view the patient will need outpatient cardiology follow-up  I will take another look at the patient tomorrow    Thank you for this consultation.      SIGNATURE: Maame Shaw MD   OFFICE: [phone]    ================================================================    REASON FOR CONSULTATION: Cardiac clearance    HISTORY: Brian Ayon is a 74 y.o. male who was admitted with abdominal pain and was found to have appendicitis  I went to see patient he was already in the OR and talk to the OR team    PAST MEDICAL HISTORY:    Past Medical History:   Diagnosis Date    Coronary artery disease     Other conditions influencing health status     No significant past medical history       PAST SURGICAL HISTORY:  Past Surgical History:   Procedure Laterality Date    CORONARY ANGIOPLASTY WITH STENT PLACEMENT  10/11/2016    Cath Placement Of Stent 1       MEDICATIONS:  No current facility-administered  medications on file prior to encounter.     No current outpatient medications on file prior to encounter.       ALLERGIES AND DRUG INTOLERANCES:   Allergies   Allergen Reactions    Aspirin Hives       FAMILY HISTORY:  No family history on file.    SOCIAL HISTORY:    Social History     Socioeconomic History    Marital status:      Spouse name: Not on file    Number of children: Not on file    Years of education: Not on file    Highest education level: Not on file   Occupational History    Not on file   Tobacco Use    Smoking status: Some Days     Types: Cigars     Start date: 1994     Passive exposure: Current    Smokeless tobacco: Former     Types: Chew   Vaping Use    Vaping status: Never Used   Substance and Sexual Activity    Alcohol use: Not Currently    Drug use: Never    Sexual activity: Defer   Other Topics Concern    Not on file   Social History Narrative    Not on file     Social Determinants of Health     Financial Resource Strain: Low Risk  (9/22/2024)    Overall Financial Resource Strain (CARDIA)     Difficulty of Paying Living Expenses: Not hard at all   Food Insecurity: No Food Insecurity (9/22/2024)    Hunger Vital Sign     Worried About Running Out of Food in the Last Year: Never true     Ran Out of Food in the Last Year: Never true   Transportation Needs: No Transportation Needs (9/22/2024)    PRAPARE - Transportation     Lack of Transportation (Medical): No     Lack of Transportation (Non-Medical): No   Physical Activity: Insufficiently Active (9/22/2024)    Exercise Vital Sign     Days of Exercise per Week: 2 days     Minutes of Exercise per Session: 30 min   Stress: No Stress Concern Present (9/22/2024)    [employer] [place] [employer] - Occupational Stress Questionnaire     Feeling of Stress : Not at all   Social Connections: Moderately Integrated (9/22/2024)    Social Connection and Isolation Panel [NHANES]     Frequency of Communication with Friends and Family: More than  "three times a week     Frequency of Social Gatherings with Friends and Family: Once a week     Attends Scientology Services: More than 4 times per year     Active Member of Clubs or Organizations: No     Attends Club or Organization Meetings: Never     Marital Status:    Intimate Partner Violence: Not At Risk (9/22/2024)    Humiliation, Afraid, Rape, and Kick questionnaire     Fear of Current or Ex-Partner: No     Emotionally Abused: No     Physically Abused: No     Sexually Abused: No   Housing Stability: Low Risk  (9/22/2024)    Housing Stability Vital Sign     Unable to Pay for Housing in the Last Year: No     Number of Times Moved in the Last Year: 1     Homeless in the Last Year: No       COMPLETE REVIEW OF SYSTEMS:  12 systems were reviewed in the chart     PHYSICAL EXAM:  /65 (BP Location: Right arm, Patient Position: Lying)   Pulse 87   Temp 36.7 °C (98.1 °F) (Temporal)   Resp 18   Ht 1.778 m (5' 10\")   Wt 94 kg (207 lb 3.7 oz)   SpO2 95%   BMI 29.73 kg/m²     LABS:  Lab Results   Component Value Date    GLUCOSE 182 (H) 09/22/2024    CALCIUM 9.4 09/22/2024     09/22/2024    K 4.2 09/22/2024    CO2 25 09/22/2024     09/22/2024    BUN 14 09/22/2024    CREATININE 0.87 09/22/2024      Lab Results   Component Value Date    WBC 14.6 (H) 09/22/2024    HGB 16.4 09/22/2024    HCT 50.2 09/22/2024    MCV 90 09/22/2024     09/22/2024        "

## 2024-09-22 NOTE — NURSING NOTE
Pt arrived to 3107 from ED at 0440.  Pt is alert and oriented x 4.  Independent.  Pt able to ambulate self from WC to bed with minimal discomfort.  Pt's pain is 3/10 currently.  Pt given ice water with swabs to maintain moisture to gum tissue.  Wife at bedside.  Call light within reach.

## 2024-09-22 NOTE — ED PROVIDER NOTES
HPI   Chief Complaint   Patient presents with    Abdominal Pain     Pt c/o mid abdominal pressure, nausea       This is a 74 years old male patient with history of coronary artery disease, hypertension presented to the emergency department with a chief complaint of mid abdominal pain.  Stated that the pain is started around 2230 while at rest watching TV.  Started suddenly, 10 out of 10 in severity, radiating to the back, feels like sharp pain, reported nausea, vomited once, denies constipation, bloody stool or black stool.  Denies bloody vomiting.  Denies chest pain, shortness of breath, headache, lightheadedness, dizziness, weakness, numbness or urinary symptoms.    Review of system: As stated above in the HPI section.            Patient History   Past Medical History:   Diagnosis Date    Other conditions influencing health status     No significant past medical history     Past Surgical History:   Procedure Laterality Date    CORONARY ANGIOPLASTY WITH STENT PLACEMENT  10/11/2016    Cath Placement Of Stent 1     No family history on file.  Social History     Tobacco Use    Smoking status: Not on file    Smokeless tobacco: Not on file   Substance Use Topics    Alcohol use: Not on file    Drug use: Not on file       Physical Exam   ED Triage Vitals [09/21/24 2352]   Temperature Heart Rate Respirations BP   36.2 °C (97.2 °F) 74 18 (!) 176/95      Pulse Ox Temp Source Heart Rate Source Patient Position   98 % Temporal Monitor Sitting      BP Location FiO2 (%)     Right arm --       Physical Exam  Vitals and nursing note reviewed.   Constitutional:       General: He is not in acute distress.     Appearance: He is well-developed.   HENT:      Head: Normocephalic and atraumatic.   Eyes:      Conjunctiva/sclera: Conjunctivae normal.   Cardiovascular:      Rate and Rhythm: Normal rate and regular rhythm.      Heart sounds: No murmur heard.  Pulmonary:      Effort: Pulmonary effort is normal. No respiratory distress.       Breath sounds: Normal breath sounds.   Abdominal:      General: There is distension.      Palpations: Abdomen is soft.      Tenderness: There is abdominal tenderness in the right upper quadrant, epigastric area and left lower quadrant.   Musculoskeletal:         General: No swelling.      Cervical back: Neck supple.   Skin:     General: Skin is warm and dry.      Capillary Refill: Capillary refill takes less than 2 seconds.   Neurological:      Mental Status: He is alert.   Psychiatric:         Mood and Affect: Mood normal.         ED Course & MDM   Diagnoses as of 09/22/24 0339   Biliary sludge                 No data recorded     Donell Coma Scale Score: 15 (09/21/24 2355 : Brianna Garibay RN)                           Medical Decision Making  Patient seen and examined, will obtain basic labs, cardiac workup given the patient's age and risk factors, obtain lipase, urinalysis, administer IV fluids, Zofran, morphine, will obtain CT abdomen pelvis with IV contrast dye.    EKG revealed normal sinus rhythm, rate is 77 bpm, normal DE, QRS, and normal QTc duration.  No ST segment or T wave pathology.      Workup is unremarkable, CT scan with a concern of gallbladder sludge.  Patient remained in pain even after administering morphine, Dilaudid.  Admitted to teaching service given his intractable abdominal pain.  Presentation is likely related to biliary colic.  May need further imaging/HIDA scan.  Admitted in stable condition peer        Procedure  Procedures     Romeo Troy DO  09/22/24 3592

## 2024-09-22 NOTE — ANESTHESIA POSTPROCEDURE EVALUATION
Patient: Brian Ayon    Procedure Summary       Date: 09/22/24 Room / Location: [facility] OR 07 / Virtual [facility] OR    Anesthesia Start: 1229 Anesthesia Stop: 1434    Procedures:       Laparoscopic Appendectomy      Cholecystectomy Laparoscopy Diagnosis:       Chronic appendicitis      Gangrenous cholecystitis      Non-recurrent bilateral inguinal hernia without obstruction or gangrene      (Acute appendicitis with localized peritonitis, without perforation, abscess, or gangrene [K35.30])    Surgeons: Prashant Alicia MD PhD Responsible Provider: Izzy Cardenas MD    Anesthesia Type: general ASA Status: 3            Anesthesia Type: general    Vitals Value Taken Time   /57 09/22/24 1600   Temp 36 °C (96.8 °F) 09/22/24 1530   Pulse 107 09/22/24 1606   Resp 7 09/22/24 1606   SpO2 94 % 09/22/24 1606   Vitals shown include unfiled device data.    Anesthesia Post Evaluation    Patient location during evaluation: PACU  Patient participation: complete - patient participated  Level of consciousness: sleepy but conscious, responsive to verbal stimuli, responsive to light touch, responsive to physical stimuli and sedated  Pain score: 4  Pain management: satisfactory to patient  Multimodal analgesia pain management approach  Airway patency: patent  Two or more strategies used to mitigate risk of obstructive sleep apnea  Cardiovascular status: acceptable and hemodynamically stable  Respiratory status: acceptable, unassisted, spontaneous ventilation, nonlabored ventilation and face mask  Hydration status: balanced  Postoperative Nausea and Vomiting: none        There were no known notable events for this encounter.

## 2024-09-22 NOTE — OP NOTE
Laparoscopic Appendectomy, Laparoscopic Cholecystectomy Operative Note     Date: 2024 - 2024  OR Location: [facility]    Name: Brian Ayon, : 1950, Age: 74 y.o., MRN: [id number], Sex: male    Diagnosis  Pre-op Diagnosis      * Acute appendicitis with localized peritonitis, without perforation, abscess, or gangrene [K35.30] Post-op Diagnosis     * Chronic appendicitis [K36]     * Gangrenous cholecystitis [K81.0]     * Non-recurrent bilateral inguinal hernia without obstruction or gangrene [K40.20]     Procedures  Laparoscopic Appendectomy  98553 - [place] LAPAROSCOPIC APPENDECTOMY    Cholecystectomy Laparoscopy  71396 - [place] LAPAROSCOPY SURG CHOLECYSTECTOMY  Modifier 22 due to gangrene, extreme inflammation with fixation of liver to abdominal wall, and short cystic duct with cystic artery takeoff directly over visualized CBD.    Surgeons      * Prashant Alicia - Primary    Resident/Fellow/Other Assistant:  Surgeons and Role:  * No surgeons found with a matching role *    Procedure Summary  Anesthesia: Anesthesia type not filed in the log.  ASA: ASA status not filed in the log.  Anesthesia Staff: Anesthesiologist: Izzy Cardenas MD  CRNA: ODALYS Greenwood-CRNA  Estimated Blood Loss: 50 mL  Intra-op Medications:   Administrations occurring from 1400 to 1535 on 24:   Medication Name Total Dose   bupivacaine PF (Marcaine) 0.5 % (5 mg/mL) injection 10 mL   piperacillin-tazobactam (Zosyn) 3.375 g in dextrose (iso) IV 50 mL Cannot be calculated              Anesthesia Record               Intraprocedure I/O Totals          Intake    LR bolus 600.00 mL    Total Intake 600 mL       Output    Urine 0 mL    Est. Blood Loss 0 mL    Total Output 0 mL       Net    Net Volume 600 mL          Specimen:   ID Type Source Tests Collected by Time   1 : Appendix Tissue APPENDIX SURGICAL PATHOLOGY EXAM Prashant Alicia MD PhD 2024 1306   2 : Gallbladder Tissue GALLBLADDER CHOLECYSTECTOMY SURGICAL  PATHOLOGY EXAM Prashant Alicia MD PhD 9/22/2024 1326   A : Gallbladder contents Tissue GALLBLADDER CHOLECYSTECTOMY TISSUE/WOUND CULTURE/SMEAR Julissa Candelaria RN 9/22/2024 1307        Staff:   Circulator: Julissa Garber Person: Tonya         Drains and/or Catheters:   Closed/Suction Drain 1 Lateral RUQ 19 Fr. (Active)       Tourniquet Times:         Implants:     Findings:     Chronic appendicitis with hyperemia of tip, bend fixation below cecum.     Gangrenous cholecystitis with tense gallbladder, adhered inflamed and friable fat, visualized CBD at level of sulcus with short anterior and posterior cystic arteries with takeoff directly over CBD from right hepatic, and short cystic duct with stones and caliber similar to CBD. CBD and HD very well visualized without direct contact or adjacent cautery. Arteries confirmed by pulsation, right hepatic visualization, and large tunica media appearance - no bile. All clips placed well above level of cystic duct takeoff. No bile spillage other than dome decompression needle cholecystotomy.    Very large right inguinal hernia with incarcerated bowel and fat, reduced. Left inguinal hernia could not be directly visualized due to adhesions between left colon and wall.          Indications: Brian Ayon is an 74 y.o. male who is having surgery for Acute appendicitis with localized peritonitis, without perforation, abscess, or gangrene [K35.30].     The patient was seen in the preoperative area. The risks, benefits, complications, treatment options, non-operative alternatives, expected recovery and outcomes were discussed with the patient. The possibilities of reaction to medication, pulmonary aspiration, injury to surrounding structures, bleeding, recurrent infection, the need for additional procedures, failure to diagnose a condition, and creating a complication requiring transfusion or operation were discussed with the patient. The patient concurred with the proposed  plan, giving informed consent.  The site of surgery was properly noted/marked if necessary per policy. The patient has been actively warmed in preoperative area. Preoperative antibiotics have been ordered and given within 4 hours of incision. Venous thrombosis prophylaxis have been ordered including bilateral sequential compression devices and chemical prophylaxis    Procedure Details: During the preoperative huddle the patient's identifiers, consent, operation details, and equipment was verified. The patient was taken to the operating room and then placed in the supine position with the left arm tucked and pressure points were padded. Sequential compression stockings were placed and general endotracheal anesthesia was administered. The patient was then prepped and draped in the usual sterile fashion. Prophylactic antibiotics were administered or continued. A surgical timeout was then performed confirming the correct patient, procedure, position, equipment, and any necessary operative implants.     Access to the abdomen was gained through a left midclavicular incision and utilized a 0° 5 mm laparoscope with an Optiview trocar. The abdomen was insufflated to a pressure of 15 mmHg after peritoneal access was obtained and there was no injury to luminal organs or surrounding structures visualized. A 30 degree scope was then used for the remainder of the case. A 12 mm port was then placed at the umbilicus. An additional 5 mm port was placed in suprapubic region. All ports were placed under direct visualization.    The patient was rolled left side down and the appendix was exposed through blunt manipulation, taking care not to grasp the appendix directly where inflammation was observed near the tip. Using blunt dissection a window was made in the mesentery of the appendix, and a medium load 45 mm ESTELA stapler was used across the base, taking care not to involve or narrow the ileal valve. The adhesions to the pelvic side  wall were taken with a scissor in an avascular plane. A vascular thickness load 45 mm ESTELA was then fired across the appendiceal mesentery with hemostasis after two 5 mm clips were applied, without involvement of adjacent structures. A piece of surgicel was applied to the dissection bed. The cecum and areas of dissection appeared hemostatic. No spillage occurred. The appendix was placed in an endocatch bag and removed through the umbilicus.     Attention was then turned to the gallbladder. It was gangrenous with horrendous inflammatory scarring between the liver and the abdominal wall. I scrubbed out to confirm again with the wife that we would need to remove the gallbladder (discussed as possibility prior to surgery) and she gave verbal permission after discussing risks and benefits. I scrubbed back in. Additional upper right 5 mm ports x 2 were placed. They were placed under direct visualization.     The omentum was swept off of the gallbladder body and the gallbladder was drained 60 ml with a laparoscopic needles. There was additional bile spillage from this site throughout the case. The gallbladder was then grasped and retracted cephalad by the dome and inferiorly and laterally at the infundibulum to open up the [place]. Gross fatty attachments were taken off the gallbladder with electrocautery, avoiding contact with underlying structures. Next, the peritoneal attachments on both the medial and lateral surfaces as well as the anterior surface of the gallbladder were taken with electrocautery - they adhesions were exceptionally thick. The [place] was then dissected out and freed of any fatty attachments revealing a complex web of scar tissue and multiple structures photographed above. The scope was changed over to a 10 mm 30 degree scope for better visualization. Carrying on this dissection further revealed the apparent CBD and HD at the level of the sulcus. There was a short cystic duct headed  into the gallbladder. Over the HD there was the right hepatic artery take off of a pulsatile anterior cystic artery as well as a smaller posterior branch, both inserting into the gallbladder with a visible node. The cystic plate was then dissected up about 60% of the gallbladder to ensure the anatomy was as visualized. The critical view of safety was then obtained, photographed, and repeatedly verified. No dissection was performed at the visualized CBD or HD. The right hepatic artery branches over the HD were very obviously separate structures. The cystic artery was taken with two clips proximally and one distally, and the posterior branch was taken as well. There was no bile and the internal lumen appeared to be an artery. The cystic duct was taken close the infundibulum with three clips distally and two proximally well above the CBD insertion, despite a short duct. One lumen was visualized for each of these structures. There was obviously a stone that was crushed at the cystic duct.    The gallbladder was then dissected off of the cystic plate using hook electrocautery. Again this was very difficult given the gangrenous nature of the gallbladder, the fixed liver, and the inflamed and thickened posterior tissues. The liver bed was hemostatic after light cautery and Kanu, and there was no bile spillage. The gallbladder was placed into an Endo Catch bag and removed through the umbilicus. The 12 mm port site was closed with a figure-of-8 0-vicryl suture. A 19 Fr drain was placed at the fossa from the right most port and sutured in place with a 3-0 nylon. The abdomen was then re-insufflated and the suture was found to be adequate without any abdominal contents stuck the suture. In addition the liver bed was re-assessed and found to be hemostatic without any bile. Irrigation of the cystic plate was completed, which was clear.     A modifier 22 is warranted. This case was 40 or more percent more difficult than an  average for acute cholecystitis due to extensive gallbladder gangrene, extreme inflammation with fixation of liver to abdominal wall, and a short cystic duct with cystic artery takeoff directly over visualized CBD. Inflammation and scar in the triangle of calot made dissection slow going and meticulous to avoid contact with these structures.    Attention to the appendix demonstrated hemostasis. Examination of the right groin showed a very large right inguinal hernia with incarcerated bowel and fat, reduced. Left inguinal hernia could not be directly visualized due to adhesions between left colon and wall.    The umbilical port site was then closed with a 0 vicryl figure-of-eight suture. The abdomen was desufflated under direct visualization. All ports were removed. All counts were correct. The port sites were closed with a 4-0 Monocryl in the subcuticular plane. Dermabond was applied.    The patient tolerated the procedure well. The patient was transported to the PACU in stable condition. Dr. Alicia was present and scrubbed for all critical portions of the case.    I discussed the case with the patient's wife and provided her the above photos.      Complications:  None; patient tolerated the procedure well.    Disposition: PACU - hemodynamically stable.  Condition: stable         Additional Details: None    Attending Attestation: I was present and scrubbed for the entire procedure.    Prashant Alicia  Phone Number: [phone]

## 2024-09-22 NOTE — HOSPITAL COURSE
Mr. Ayon is a 74-year-old male who initially presented to [facility] ED on 9/21 complaining of acute periumbilical abdominal pain with radiation to the right quadrant.  On presentation to the ED emergency room, patient was afebrile but hypertensive at 158/89.  No leukocytosis, hemoglobin 16.7.  CMP was largely unremarkable.  AST 16, ALT 18, ALP 78.  Lipase 37.  CT abdomen pelvis showed sludge in the dependent portion of the gallbladder without definitive evidence of acute cholecystitis.  Patient was admitted for management of his abdominal pain and further workup.  His white blood cell count did uptrend, to a max of 15.6.  He was started on antibiotics and general surgery planned for surgical intervention.  On 9/22, patient underwent laparoscopic cholecystectomy and appendectomy with placement of a MAEVE drain.  His white blood cell count did downtrend, will discharge at 10.  He was afebrile during his stay.  Continued on IV antibiotics.  Surgical course was complicated by postoperative ileus, which resolved with conservative treatment.  He was deemed stable for discharge on 9/26 as he was saturating well on room air and was able to tolerate a low-fat diet.  He was having consistent bowel movements, no nausea, improvement in his abdominal pain.  The MAEVE drain will be pulled prior to discharge.  He will be discharged with 4 additional days of Augmentin, to be completed on 9/30.  He was also started on atorvastatin as well as metoprolol due to his past coronary history.  He was instructed to follow-up with his cardiologist, Dr. Melara, within 4 to 6 weeks of hospital discharge to further discuss these medications.  He will also be referred to a primary care physician as he does not have one currently.  He has to follow-up with Dr. Alicia, his surgeon, in 2 weeks.

## 2024-09-22 NOTE — TREATMENT PLAN
Chronic appendicitis and gangrenous cholecystitis - both removed, and large inguinal hernias.    Drain placed in gallbladder fossa.  Labs tonight and in AM.  Liquid diet only. Expect ileus.  Continue antibiotics. Course must last 4 days total.  Surgery will follow.

## 2024-09-22 NOTE — CONSULTS
Inpatient consult to Acute Care Surgery  Consult performed by: ODALYS Ayala-CNP  Consult ordered by: Pancho Whitley DO  Reason for consult: Acute appendicitis          History Of Present Illness  Brian Ayon is a 74 y.o. male presenting with Right sided abdominal pain 30 mins after dinner. He claims that the pain has never been this bad. He denies fever, vomiting.     He claims he's had 2 cardiac stents but is not on blood thinners. He has lost weight from 240 lbs (Feb 2021) to 200 lbs in 8 months.     Allergy: ASA (hives)     Past Medical History  Past Medical History:   Diagnosis Date    Coronary artery disease     Other conditions influencing health status     No significant past medical history        Surgical History  Past Surgical History:   Procedure Laterality Date    CORONARY ANGIOPLASTY WITH STENT PLACEMENT  10/11/2016    Cath Placement Of Stent 1         Social History  Social Determinants of Health     Tobacco Use: High Risk (9/22/2024)    Patient History     Smoking Tobacco Use: Some Days     Smokeless Tobacco Use: Former     Passive Exposure: Current   Alcohol Use: Not At Risk (9/22/2024)    AUDIT-C     Frequency of Alcohol Consumption: Monthly or less     Average Number of Drinks: 1 or 2     Frequency of Binge Drinking: Never   Financial Resource Strain: Low Risk  (9/22/2024)    Overall Financial Resource Strain (CARDIA)     Difficulty of Paying Living Expenses: Not hard at all   Food Insecurity: No Food Insecurity (9/22/2024)    Hunger Vital Sign     Worried About Running Out of Food in the Last Year: Never true     Ran Out of Food in the Last Year: Never true   Transportation Needs: No Transportation Needs (9/22/2024)    PRAPARE - Transportation     Lack of Transportation (Medical): No     Lack of Transportation (Non-Medical): No   Physical Activity: Insufficiently Active (9/22/2024)    Exercise Vital Sign     Days of Exercise per Week: 2 days     Minutes of Exercise per Session: 30 min    Stress: No Stress Concern Present (9/22/2024)    [employer] - Occupational Stress Questionnaire     Feeling of Stress : Not at all   Social Connections: Moderately Integrated (9/22/2024)    Social Connection and Isolation Panel [NHANES]     Frequency of Communication with Friends and Family: More than three times a week     Frequency of Social Gatherings with Friends and Family: Once a week     Attends Rastafarian Services: More than 4 times per year     Active Member of Clubs or Organizations: No     Attends Club or Organization Meetings: Never     Marital Status:    Intimate Partner Violence: Not At Risk (9/22/2024)    Humiliation, Afraid, Rape, and Kick questionnaire     Fear of Current or Ex-Partner: No     Emotionally Abused: No     Physically Abused: No     Sexually Abused: No   Depression: Not at risk (9/22/2024)    PHQ-2     PHQ-2 Score: 0   Housing Stability: Low Risk  (9/22/2024)    Housing Stability Vital Sign     Unable to Pay for Housing in the Last Year: No     Number of Times Moved in the Last Year: 1     Homeless in the Last Year: No   Utilities: Not At Risk (9/22/2024)    [facility] Utilities     Threatened with loss of utilities: No   Digital Equity: Not on file   Health Literacy: Adequate Health Literacy (9/22/2024)     Health Literacy     Frequency of need for help with medical instructions: Never        Family History  No family history on file.     Home Medications  Prior to Admission medications    Not on File        Allergies  Aspirin    Review of Systems  Review of Systems   Constitutional:  Negative for fever and unexpected weight change.   Respiratory:  Negative for cough, shortness of breath and wheezing.    Cardiovascular:  Negative for chest pain.   Gastrointestinal:  Positive for abdominal pain. Negative for constipation, diarrhea, nausea and vomiting.   Psychiatric/Behavioral:  Negative for agitation and behavioral problems.         Physical  "Exam  Physical Exam  Cardiovascular:      Rate and Rhythm: Normal rate and regular rhythm.   Pulmonary:      Effort: Pulmonary effort is normal.      Breath sounds: Normal breath sounds.   Abdominal:      Palpations: Abdomen is soft.      Tenderness: There is abdominal tenderness. There is no right CVA tenderness.   Musculoskeletal:         General: Normal range of motion.   Skin:     General: Skin is warm and dry.   Neurological:      General: No focal deficit present.      Mental Status: He is alert.   Psychiatric:         Mood and Affect: Mood normal.          Medications  Scheduled medications  atorvastatin, 40 mg, oral, Nightly  [Held by provider] enoxaparin, 40 mg, subcutaneous, q24h  piperacillin-tazobactam, 3.375 g, intravenous, q8h      Continuous medications  lactated Ringer's, 125 mL/hr, Last Rate: 125 mL/hr (09/22/24 1116)      PRN medications  PRN medications: HYDROmorphone, HYDROmorphone, ondansetron     Last Recorded Vitals  Heart Rate:  [73-92]   Temp:  [36 °C (96.8 °F)-36.7 °C (98.1 °F)]   Resp:  [16-21]   BP: (134-178)/(65-97)   Height:  [177.8 cm (5' 10\")]   Weight:  [90.7 kg (200 lb)-94 kg (207 lb 3.7 oz)]   SpO2:  [92 %-98 %]      Input/Output    Intake/Output Summary (Last 24 hours) at 9/22/2024 1146  Last data filed at 9/22/2024 1140  Gross per 24 hour   Intake 1410.42 ml   Output --   Net 1410.42 ml        Labs  Results for orders placed or performed during the hospital encounter of 09/21/24 (from the past 24 hour(s))   CBC and Auto Differential   Result Value Ref Range    WBC 9.7 4.4 - 11.3 x10*3/uL    nRBC 0.0 0.0 - 0.0 /100 WBCs    RBC 5.62 4.50 - 5.90 x10*6/uL    Hemoglobin 16.7 13.5 - 17.5 g/dL    Hematocrit 49.9 41.0 - 52.0 %    MCV 89 80 - 100 fL    MCH 29.7 26.0 - 34.0 pg    MCHC 33.5 32.0 - 36.0 g/dL    RDW 13.2 11.5 - 14.5 %    Platelets 220 150 - 450 x10*3/uL    Neutrophils % 60.1 40.0 - 80.0 %    Immature Granulocytes %, Automated 0.3 0.0 - 0.9 %    Lymphocytes % 26.4 13.0 - 44.0 " %    Monocytes % 9.3 2.0 - 10.0 %    Eosinophils % 3.4 0.0 - 6.0 %    Basophils % 0.5 0.0 - 2.0 %    Neutrophils Absolute 5.79 (H) 1.60 - 5.50 x10*3/uL    Immature Granulocytes Absolute, Automated 0.03 0.00 - 0.50 x10*3/uL    Lymphocytes Absolute 2.55 0.80 - 3.00 x10*3/uL    Monocytes Absolute 0.90 (H) 0.05 - 0.80 x10*3/uL    Eosinophils Absolute 0.33 0.00 - 0.40 x10*3/uL    Basophils Absolute 0.05 0.00 - 0.10 x10*3/uL   Comprehensive metabolic panel   Result Value Ref Range    Glucose 175 (H) 74 - 99 mg/dL    Sodium 137 136 - 145 mmol/L    Potassium 4.1 3.5 - 5.3 mmol/L    Chloride 106 98 - 107 mmol/L    Bicarbonate 21 21 - 32 mmol/L    Anion Gap 14 10 - 20 mmol/L    Urea Nitrogen 16 6 - 23 mg/dL    Creatinine 0.87 0.50 - 1.30 mg/dL    eGFR >90 >60 mL/min/1.73m*2    Calcium 9.4 8.6 - 10.3 mg/dL    Albumin 4.3 3.4 - 5.0 g/dL    Alkaline Phosphatase 78 33 - 136 U/L    Total Protein 7.6 6.4 - 8.2 g/dL    AST 16 9 - 39 U/L    Bilirubin, Total 0.7 0.0 - 1.2 mg/dL    ALT 18 10 - 52 U/L   Troponin I, High Sensitivity   Result Value Ref Range    Troponin I, High Sensitivity 6 0 - 20 ng/L   Lipase   Result Value Ref Range    Lipase 37 9 - 82 U/L   Magnesium   Result Value Ref Range    Magnesium 1.88 1.60 - 2.40 mg/dL   Hemoglobin A1c   Result Value Ref Range    Hemoglobin A1C 7.1 (H) See comment %    Estimated Average Glucose 157 Not Established mg/dL   Urinalysis with Reflex Culture and Microscopic   Result Value Ref Range    Color, Urine Light-Yellow Light-Yellow, Yellow, Dark-Yellow    Appearance, Urine Clear Clear    Specific Gravity, Urine 1.044 (N) 1.005 - 1.035    pH, Urine 6.0 5.0, 5.5, 6.0, 6.5, 7.0, 7.5, 8.0    Protein, Urine NEGATIVE NEGATIVE, 10 (TRACE), 20 (TRACE) mg/dL    Glucose, Urine Normal Normal mg/dL    Blood, Urine NEGATIVE NEGATIVE    Ketones, Urine NEGATIVE NEGATIVE mg/dL    Bilirubin, Urine NEGATIVE NEGATIVE    Urobilinogen, Urine 2 (1+) (A) Normal mg/dL    Nitrite, Urine NEGATIVE NEGATIVE     Leukocyte Esterase, Urine NEGATIVE NEGATIVE   Lipid panel   Result Value Ref Range    Cholesterol 210 (H) 0 - 199 mg/dL    HDL-Cholesterol 37.6 mg/dL    Cholesterol/HDL Ratio 5.6     LDL Calculated 152 (H) <=99 mg/dL    VLDL 20 0 - 40 mg/dL    Triglycerides 100 0 - 149 mg/dL    Non HDL Cholesterol 172 (H) 0 - 149 mg/dL   Renal function panel   Result Value Ref Range    Glucose 182 (H) 74 - 99 mg/dL    Sodium 138 136 - 145 mmol/L    Potassium 4.2 3.5 - 5.3 mmol/L    Chloride 103 98 - 107 mmol/L    Bicarbonate 25 21 - 32 mmol/L    Anion Gap 14 10 - 20 mmol/L    Urea Nitrogen 14 6 - 23 mg/dL    Creatinine 0.87 0.50 - 1.30 mg/dL    eGFR >90 >60 mL/min/1.73m*2    Calcium 9.4 8.6 - 10.3 mg/dL    Phosphorus 2.8 2.5 - 4.9 mg/dL    Albumin 4.3 3.4 - 5.0 g/dL   Magnesium   Result Value Ref Range    Magnesium 1.77 1.60 - 2.40 mg/dL   SST TOP   Result Value Ref Range    Extra Tube Hold for add-ons.    Troponin I, High Sensitivity   Result Value Ref Range    Troponin I, High Sensitivity 7 0 - 20 ng/L   CBC and Auto Differential   Result Value Ref Range    WBC 14.6 (H) 4.4 - 11.3 x10*3/uL    nRBC 0.0 0.0 - 0.0 /100 WBCs    RBC 5.58 4.50 - 5.90 x10*6/uL    Hemoglobin 16.4 13.5 - 17.5 g/dL    Hematocrit 50.2 41.0 - 52.0 %    MCV 90 80 - 100 fL    MCH 29.4 26.0 - 34.0 pg    MCHC 32.7 32.0 - 36.0 g/dL    RDW 13.0 11.5 - 14.5 %    Platelets 213 150 - 450 x10*3/uL    Neutrophils % 86.2 40.0 - 80.0 %    Immature Granulocytes %, Automated 0.5 0.0 - 0.9 %    Lymphocytes % 8.0 13.0 - 44.0 %    Monocytes % 4.9 2.0 - 10.0 %    Eosinophils % 0.2 0.0 - 6.0 %    Basophils % 0.2 0.0 - 2.0 %    Neutrophils Absolute 12.62 (H) 1.60 - 5.50 x10*3/uL    Immature Granulocytes Absolute, Automated 0.07 0.00 - 0.50 x10*3/uL    Lymphocytes Absolute 1.17 0.80 - 3.00 x10*3/uL    Monocytes Absolute 0.72 0.05 - 0.80 x10*3/uL    Eosinophils Absolute 0.03 0.00 - 0.40 x10*3/uL    Basophils Absolute 0.03 0.00 - 0.10 x10*3/uL   Lactate   Result Value Ref Range     Lactate 1.4 0.4 - 2.0 mmol/L       Imaging  CT angio chest abdomen pelvis    Result Date: 9/22/2024  Interpreted By:  Renita Payton, STUDY: CT ANGIO CHEST ABDOMEN PELVIS;  9/22/2024 7:00 am   INDICATION: Signs/Symptoms:concern for dissection     COMPARISON: CT abdomen pelvis 09/22/2024 at 1:11 a.m.   ACCESSION NUMBER(S): [id number]   ORDERING CLINICIAN: BK VELEZ   TECHNIQUE: CT of the chest, abdomen, and pelvis was performed before and after intravenous contrast. Patient received 90 mL of Omnipaque 350 intravenously. Contiguous axial images were obtained at  5 mm slice thickness through the chest, and at  3 mm through the abdomen and pelvis. Coronal and sagittal reconstructions at  3 mm slice thickness were performed.   CT angiography was performed through the chest, abdomen, and pelvis. 3D reconstructions were performed as well as coronal and sagittal MIP images   FINDINGS: CHEST: Thyroid gland unremarkable. No aortic aneurysm or aortic dissection. No mediastinal hematoma no CT signs of pulmonary embolism. Severe atherosclerotic coronary artery calcifications are seen. No adenopathy. Spurring noted in the thoracic spine. Dependent atelectasis is seen in the lung bases. Mild subpleural scarring in the left lower lobe posteriorly and laterally as well. No infiltrates or effusions. Left gynecomastia seen axial image 122. There is circumferential wall thickening of the esophagus which can be seen with esophagitis   CT angiogram of the abdominal aorta and branches: The abdominal aorta is normal in size with atherosclerotic calcification. No sign of aortic dissection or aortic aneurysm. The celiac artery, superior mesenteric artery, and inferior mesenteric artery are patent. Common iliac arteries as well as internal and external iliac arteries are patent with atherosclerotic calcification noted. Right common femoral artery is patent with atherosclerotic plaque. The left common femoral artery is occluded axial  image 572. The distal left common femoral artery is reconstituted at the junction of the profunda femoris and superficial femoral artery.   Abdomen: Gallbladder is distended and contains sludge. No intrahepatic biliary distention. Common bile duct is nondilated. The liver, spleen,, and adrenal glands appear normal. The kidneys show no hydronephrosis and appear normal. No bowel obstruction, free fluid or free air. There are a few scattered diverticula throughout the colon.   Pelvis: The appendix is elongated with the proximal portion containing air and an elongated fluid-filled distal portion having a short axis measurement of 10 mm. There is no periappendiceal abscess. No periappendiceal inflammatory changes at this time. There is extensive sigmoid diverticulosis but no definite sign of diverticulitis. A moderate right inguinal hernia fat with a knuckle of sigmoid colon extending toward the hernia. There is a smaller fat containing left inguinal hernia. Prostate gland is enlarged and contains a few calcifications. Urinary bladder shows mild wall thickening. Degenerative changes noted throughout the spine.         1. The appendix is elongated with the distal half of the appendix being mildly distended and fluid-filled measuring 10 mm short axis. Clinical correlation is needed if there is concern for acute appendicitis. No periappendiceal abscess noted. Consider surgical consultation. 2. Severe atherosclerotic coronary artery calcifications 3. Distended gallbladder containing sludge 4. 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 No aortic aneurysm or aortic dissection. 5. Colonic diverticulosis most severe in the sigmoid colon but no sign of diverticulitis 6. Bilateral fat containing inguinal hernias right worse than left 7. Diffuse wall thickening of the esophagus possibly due to esophagitis.   MACRO: Critical Finding:  See findings.  Notification was initiated on 9/22/2024 at 7:36 am by  Renita Payton.  (**-OCF-**) Instructions:   Signed by: Renita Payton 9/22/2024 7:37 AM Dictation workstation:   [id number]    CT abdomen pelvis w IV contrast    Result Date: 9/22/2024  Interpreted By:  David Lance, STUDY: CT ABDOMEN PELVIS W IV CONTRAST;  9/22/2024 1:15 am   INDICATION: Signs/Symptoms:Epigastric and central abdominal pain x 4 hours. Nausea and vomiting.  No prior abdominal surgeries..   COMPARISON: None.   ACCESSION NUMBER(S): [id number]   ORDERING CLINICIAN: SANDIP GALLEGO   TECHNIQUE: Contiguous axial images of the abdomen and pelvis were obtained after the intravenous administration of iodinated contrast. Coronal and sagittal reformatted images were reconstructed from the axial data.   FINDINGS: There is sludge dependent portion of the gallbladder. No wall thickening or pericholecystic fluid.   The liver, adrenals, pancreas and spleen are unremarkable.   Kidneys intact without hydronephrosis or hydroureter. Bladder within normal limits.   No bowel obstruction. No appendicitis. There is diverticulosis without diverticulitis.   No free fluid or significant adenopathy.   There is a fat containing right inguinal hernia which also partially contains a loop of small bowel. No incarceration/obstruction.   Calcification of the prostate which is mildly enlarged.   Incidental note made of segmental occlusion of the left common femoral artery which is suspected.   Moderate calcification aorta without aneurysm. Severe coronary artery calcification is incidentally seen in the chest.   Mild to moderate spondylotic degeneration seen axial skeleton.       Sludge seen dependent portion of the gallbladder without definite evidence of acute cholecystitis. If there is clinical concern consider HIDA scan.   Additional findings as above.     MACRO: None.   Signed by: David Lance 9/22/2024 1:53 AM Dictation workstation:   [id number]       Plan  Acute  Appendicitis  Biliary sludge on CT.    NPO  Start Zosyn  Pain meds: Dilaudid prn  Plan for Appendectomy/exploration  SCD for DVT prevention      I spent 20 minutes in the professional and overall care of this patient.     Plan of care discussed and patient seen with Dr Alicia.    Meg Castillo, ODALYS-CNP

## 2024-09-22 NOTE — CARE PLAN
The patient's goals for the shift include    Problem: Pain - Adult  Goal: Verbalizes/displays adequate comfort level or baseline comfort level  Outcome: Progressing     Problem: Safety - Adult  Goal: Free from fall injury  Outcome: Progressing     Problem: Discharge Planning  Goal: Discharge to home or other facility with appropriate resources  Outcome: Progressing     Problem: Chronic Conditions and Co-morbidities  Goal: Patient's chronic conditions and co-morbidity symptoms are monitored and maintained or improved  Outcome: Progressing     Problem: Fall/Injury  Goal: Not fall by end of shift  Outcome: Progressing  Goal: Be free from injury by end of the shift  Outcome: Progressing  Goal: Verbalize understanding of personal risk factors for fall in the hospital  Outcome: Progressing  Goal: Verbalize understanding of risk factor reduction measures to prevent injury from fall in the home  Outcome: Progressing  Goal: Use assistive devices by end of the shift  Outcome: Progressing  Goal: Pace activities to prevent fatigue by end of the shift  Outcome: Progressing     Problem: Pain  Goal: Takes deep breaths with improved pain control throughout the shift  Outcome: Progressing  Goal: Turns in bed with improved pain control throughout the shift  Outcome: Progressing  Goal: Walks with improved pain control throughout the shift  Outcome: Progressing  Goal: Performs ADL's with improved pain control throughout shift  Outcome: Progressing  Goal: Participates in PT with improved pain control throughout the shift  Outcome: Progressing  Goal: Free from opioid side effects throughout the shift  Outcome: Progressing  Goal: Free from acute confusion related to pain meds throughout the shift  Outcome: Progressing       The clinical goals for the shift include pt will report improved abd pain by the end of this shift

## 2024-09-22 NOTE — H&P
Internal Medicine    Admission H&P      Patient Brian Ayon PCP No Assigned PCP Generic Provider, MD    MRN [id number]  Admission Date 9/21/2024      Chief Complaint/Reason for Admission:  Brian is a 74 y.o. male who presented today with  abdominal pain.    Subjective    Subjective   History of Presenting Illness  Mr. Brian Ayon is a 74 y.o. male patient with previous medical history of coronary artery disease s/p PCI, hypertension, hyperlipidemia presented to the emergency department with abdominal pain more on periumbilical and right upper quadrant of abdomen. Patient stated he started to have the pain suddenly at 10:30 pm on 9/21. He rated the pain 10 out of 10, radiating to the back and described the pain as sharp pain. He also stated that he feels nauseated and vomiting once in the ED. Despite receiving dilaudid and morphine in the emergency department he is still complaining of abdominal pain. Otherwise patient denied fever, diarrhea, chest pain, cough, headache, bloody stool, lightheadedness, dizziness or urinary symptoms.     ED course:  V/S:    BP- 158/89       WY-85       RR-16    Labs:   WBC-9.7  H/H-16.7/49.9, PLT-220, Glu-175, Na-137, K-4.1, Cl-106, BUN-16, Cr-0.87, Ca-9.4, Albumin-4.3, AST-16, ALT-18, ALP-78, Bilirubin T-0.7, Total protein-7.6, Lipase-37, Troponin-6    Imaging:  CT abdomen pelvis w iv contrast- Sludge seen dependent portion of the gallbladder without definite evidence of acute cholecystitis.     Intervention:In the ED patient received Dilaudid, LR bolus 1000 ml, morphine , Zofran    Past Medical History  Active Ambulatory Problems     Diagnosis Date Noted   • No Active Ambulatory Problems     Resolved Ambulatory Problems     Diagnosis Date Noted   • No Resolved Ambulatory Problems     Past Medical History:   Diagnosis Date   • Other conditions influencing health status            Home Medication:  Prior to Admission medications    Not on File            Allergies:  Allergies  "  Allergen Reactions   • Aspirin Hives        Review of System:  Pertinent positives and negatives as per history of present illness. Remainder of 10 point review of systems is negative.    Objective    Objective   Vital Signs:  Visit Vitals  /81   Pulse 84   Temp 36.2 °C (97.2 °F) (Temporal)   Resp 18   Ht 1.778 m (5' 10\")   Wt 90.7 kg (200 lb)   SpO2 94%   BMI 28.70 kg/m²   BSA 2.12 m²        Physical Examination:  Constitutional:       General: He is not in acute distress.  HENT:      Head: Normocephalic and atraumatic.   Eyes:      Conjunctiva/sclera: Conjunctivae normal.   Cardiovascular:      Rate and Rhythm: Normal rate and regular rhythm.      Heart sounds: No murmur heard.  Pulmonary:      Effort: Pulmonary effort is normal. No respiratory distress.      Breath sounds: Normal breath sounds.   Abdominal:      General: There is distension.      Palpations: Abdomen is soft.      Tenderness: There is abdominal tenderness in the right upper quadrant and epigastric area .   Musculoskeletal:         General: No swelling.      Cervical back: Neck supple.   Skin:     General: Skin is warm and dry.   Neurological:      Mental Status: He is alert.   Psychiatric:         Mood and Affect: Mood normal.      Laboratory Data:      Results for orders placed or performed during the hospital encounter of 09/21/24 (from the past 24 hour(s))   CBC and Auto Differential   Result Value Ref Range    WBC 9.7 4.4 - 11.3 x10*3/uL    nRBC 0.0 0.0 - 0.0 /100 WBCs    RBC 5.62 4.50 - 5.90 x10*6/uL    Hemoglobin 16.7 13.5 - 17.5 g/dL    Hematocrit 49.9 41.0 - 52.0 %    MCV 89 80 - 100 fL    MCH 29.7 26.0 - 34.0 pg    MCHC 33.5 32.0 - 36.0 g/dL    RDW 13.2 11.5 - 14.5 %    Platelets 220 150 - 450 x10*3/uL    Neutrophils % 60.1 40.0 - 80.0 %    Immature Granulocytes %, Automated 0.3 0.0 - 0.9 %    Lymphocytes % 26.4 13.0 - 44.0 %    Monocytes % 9.3 2.0 - 10.0 %    Eosinophils % 3.4 0.0 - 6.0 %    Basophils % 0.5 0.0 - 2.0 %    " Neutrophils Absolute 5.79 (H) 1.60 - 5.50 x10*3/uL    Immature Granulocytes Absolute, Automated 0.03 0.00 - 0.50 x10*3/uL    Lymphocytes Absolute 2.55 0.80 - 3.00 x10*3/uL    Monocytes Absolute 0.90 (H) 0.05 - 0.80 x10*3/uL    Eosinophils Absolute 0.33 0.00 - 0.40 x10*3/uL    Basophils Absolute 0.05 0.00 - 0.10 x10*3/uL   Comprehensive metabolic panel   Result Value Ref Range    Glucose 175 (H) 74 - 99 mg/dL    Sodium 137 136 - 145 mmol/L    Potassium 4.1 3.5 - 5.3 mmol/L    Chloride 106 98 - 107 mmol/L    Bicarbonate 21 21 - 32 mmol/L    Anion Gap 14 10 - 20 mmol/L    Urea Nitrogen 16 6 - 23 mg/dL    Creatinine 0.87 0.50 - 1.30 mg/dL    eGFR >90 >60 mL/min/1.73m*2    Calcium 9.4 8.6 - 10.3 mg/dL    Albumin 4.3 3.4 - 5.0 g/dL    Alkaline Phosphatase 78 33 - 136 U/L    Total Protein 7.6 6.4 - 8.2 g/dL    AST 16 9 - 39 U/L    Bilirubin, Total 0.7 0.0 - 1.2 mg/dL    ALT 18 10 - 52 U/L   Troponin I, High Sensitivity   Result Value Ref Range    Troponin I, High Sensitivity 6 0 - 20 ng/L   Lipase   Result Value Ref Range    Lipase 37 9 - 82 U/L   Magnesium   Result Value Ref Range    Magnesium 1.88 1.60 - 2.40 mg/dL   Urinalysis with Reflex Culture and Microscopic   Result Value Ref Range    Color, Urine Light-Yellow Light-Yellow, Yellow, Dark-Yellow    Appearance, Urine Clear Clear    Specific Gravity, Urine 1.044 (N) 1.005 - 1.035    pH, Urine 6.0 5.0, 5.5, 6.0, 6.5, 7.0, 7.5, 8.0    Protein, Urine NEGATIVE NEGATIVE, 10 (TRACE), 20 (TRACE) mg/dL    Glucose, Urine Normal Normal mg/dL    Blood, Urine NEGATIVE NEGATIVE    Ketones, Urine NEGATIVE NEGATIVE mg/dL    Bilirubin, Urine NEGATIVE NEGATIVE    Urobilinogen, Urine 2 (1+) (A) Normal mg/dL    Nitrite, Urine NEGATIVE NEGATIVE    Leukocyte Esterase, Urine NEGATIVE NEGATIVE        Imaging:  CT abdomen pelvis w IV contrast    Result Date: 9/22/2024  Interpreted By:  David Lance, STUDY: CT ABDOMEN PELVIS W IV CONTRAST;  9/22/2024 1:15 am   INDICATION:  Signs/Symptoms:Epigastric and central abdominal pain x 4 hours. Nausea and vomiting.  No prior abdominal surgeries..   COMPARISON: None.   ACCESSION NUMBER(S): [id number]   ORDERING CLINICIAN: SANDIP GALLEGO   TECHNIQUE: Contiguous axial images of the abdomen and pelvis were obtained after the intravenous administration of iodinated contrast. Coronal and sagittal reformatted images were reconstructed from the axial data.   FINDINGS: There is sludge dependent portion of the gallbladder. No wall thickening or pericholecystic fluid.   The liver, adrenals, pancreas and spleen are unremarkable.   Kidneys intact without hydronephrosis or hydroureter. Bladder within normal limits.   No bowel obstruction. No appendicitis. There is diverticulosis without diverticulitis.   No free fluid or significant adenopathy.   There is a fat containing right inguinal hernia which also partially contains a loop of small bowel. No incarceration/obstruction.   Calcification of the prostate which is mildly enlarged.   Incidental note made of segmental occlusion of the left common femoral artery which is suspected.   Moderate calcification aorta without aneurysm. Severe coronary artery calcification is incidentally seen in the chest.   Mild to moderate spondylotic degeneration seen axial skeleton.       Sludge seen dependent portion of the gallbladder without definite evidence of acute cholecystitis. If there is clinical concern consider HIDA scan.   Additional findings as above.     MACRO: None.   Signed by: David Lance 9/22/2024 1:53 AM Dictation workstation:   [id number]      Medications:  Scheduled medications  enoxaparin, 40 mg, subcutaneous, q24h  HYDROmorphone, 1 mg, intravenous, Once      Continuous medications     PRN medications      Assessment    Assessment & Plan   Mr. Brian Ayon is a 74 y.o. male patient with previous medical history of coronary artery disease s/p PCI, hypertension and hyperlipidemia presented to the ED  with abdominal pain more on periumbilical area. Despite receiving morphine and dilaudid patient is still complaining of abdominal pain.    # Abdominal pain c/f acute mesenteric ischemia    - He presented to the ED with a complaint of abdominal pain more on periumbilical area. The pain started suddenly at 10:30 pm. He stated that he feels nauseated and vomiting once in the ED. Despite receiving dilaudid and morphine patient is still complaining of severe abdominal pain.  - He is afebrile and has mildly high blood pressure which could be because of the abdominal pain.  - CBC negative for leukocytosis, CMP didn't show any electrolyte abnormalities, troponin done once at the ED and was 6.  - EKG reveals normal sinus rhythm, rate-77, no ST segment or T wave pathology.  - CT abdomen pelvis w iv contrast- Sludge seen dependent portion of the gallbladder without definite evidence of acute cholecystitis.   Plan   - NPO  - Telemetry monitoring  - Monitor vital sign  - Will get lactate  - CTA of abdomen  - Daily labs  - Protonix        CHRONIC PROBLEMS:  # CAD s/p PCI  # Hypertension  # Hyperlipidemia          Global Plan of Care  Fluid: PRN  Electrolytes: PRN  Nutrition:   n.p.o.  DVT Prophylaxis:  SCD  GI Prophylaxis:  Code Status: Full Code     Emergency Contact: Extended Emergency Contact Information  Primary Emergency Contact: Adelita Ayon  Home Phone: [phone]  Relation: Spouse    Patient seen and discussed with the attending.  To be staffed with attending.  Signature: Pierre Simeon MD  Date: September 22, 2024  This note has been transcribed using Dragon voice recognition system and there is a possibility of unintentional typing misprints.  Any information found to be copied from previous providers is done in the best interest of the patient to provide accurate, quality, and continuity of care.     Senior Resident Addendum:  Patient seen and examined independently of intern resident. The above documentation was  reviewed by me and reflects my direct input. My changes are italicized.    Brian Ayon 74yoM w/ PMHx of HTN, HLD, CAD s/p PCI w/ stent to LAD 2021 after STEMI , another stent in 2016 but unclear where who presented for severe sudden onset periumbilical abd pain, nausea and one episode of nonbilous nonbloody vomiting while in the ED.  States this does for like his prior chest pain when having a STEMI.  When he has had a heart attack he describes his pain as reflux and the feeling of belching. He smokes cigars regularly and has not seen a primary care doctor in over 10 years and has not seen his cardiologist since 2021.  He takes no medications. States was watching college football and around 10:30 PM had constant sudden onset severe 10 out of 10 chest pain wrapping into the back.  He states the morphine he received did not help much but the Dilaudid did. also endorses nausea and an episode of vomiting as well as tingling in both LE.  When examined in the ED, in mild distress due to pain and laying on left side. Pulses appear equal on BL wrists.  Hemodynamically stable though hypertensive with systolics originally in the 170s, repeat /97 right, 158/96 left. Labs relatively unremarkable.  With the lipase of 37 and troponin of 6.  Lactate was not ordered. EKG normal sinus rhythm and no ST changes. CT abdomen pelvis with contrast concerning for biliary colic and patient was admitted to the floor.  Of note there is a segmental occlusion of the left common femoral artery.    Patient's abdominal pain appears out of proportion to biliary colic.  I suspect this patient's abdominal pain is due to mesenteric ischemia given location as periumbilical, severity and has multiple risk factors including cigar, severe CAD s/p STEMI x 2 with stent to the LAD and another stent though unknown location.  Another life-threatening differential includes aortic dissection.  I did reach out to the radiologist and he confirmed that there  does not appear to be dissection on the CTA of the abdomen pelvis however the chest was not examined in the CT scan.  Other differentials include GERD, ACS (though less likely given troponin of 6 and nonischemic EKG) or other GI sources such as constipation, colitis, appendicitis (CT negative), peptic ulcer.    Given the concern for dissection though low suspicion will obtain CTA chest abdomen pelvis.  This has been ordered as acutely life or limb threatening.  Nursing reminded to take the patient ASAP.  Lactate, repeat troponin and repeat EKG additionally ordered.  Will cover with Zosyn in the setting of concern for mesenteric ischemia.  Will hold off on blood thinners until dissection ruled out.  Will assess the gallbladder and biliary tree with a right upper quadrant.  Pain and nausea control with Dilaudid and Zofran.    -Sarah Smith D.O. PGY-3  [facility] Academic Chief Resident

## 2024-09-23 ENCOUNTER — APPOINTMENT (OUTPATIENT)
Dept: RADIOLOGY | Facility: HOSPITAL | Age: 74
End: 2024-09-23
Payer: MEDICARE

## 2024-09-23 LAB
ALBUMIN SERPL BCP-MCNC: 3.6 G/DL (ref 3.4–5)
ALBUMIN SERPL BCP-MCNC: 3.7 G/DL (ref 3.4–5)
ALP SERPL-CCNC: 48 U/L (ref 33–136)
ALT SERPL W P-5'-P-CCNC: 28 U/L (ref 10–52)
ANION GAP SERPL CALC-SCNC: 12 MMOL/L (ref 10–20)
AST SERPL W P-5'-P-CCNC: 33 U/L (ref 9–39)
BILIRUB DIRECT SERPL-MCNC: 0.6 MG/DL (ref 0–0.3)
BILIRUB SERPL-MCNC: 2 MG/DL (ref 0–1.2)
BUN SERPL-MCNC: 14 MG/DL (ref 6–23)
CALCIUM SERPL-MCNC: 8.7 MG/DL (ref 8.6–10.3)
CHLORIDE SERPL-SCNC: 103 MMOL/L (ref 98–107)
CO2 SERPL-SCNC: 23 MMOL/L (ref 21–32)
CREAT SERPL-MCNC: 0.98 MG/DL (ref 0.5–1.3)
EGFRCR SERPLBLD CKD-EPI 2021: 82 ML/MIN/1.73M*2
ERYTHROCYTE [DISTWIDTH] IN BLOOD BY AUTOMATED COUNT: 13.4 % (ref 11.5–14.5)
GLUCOSE BLD MANUAL STRIP-MCNC: 199 MG/DL (ref 74–99)
GLUCOSE BLD MANUAL STRIP-MCNC: 216 MG/DL (ref 74–99)
GLUCOSE SERPL-MCNC: 196 MG/DL (ref 74–99)
HCT VFR BLD AUTO: 42.9 % (ref 41–52)
HGB BLD-MCNC: 14.6 G/DL (ref 13.5–17.5)
MAGNESIUM SERPL-MCNC: 1.44 MG/DL (ref 1.6–2.4)
MCH RBC QN AUTO: 31.4 PG (ref 26–34)
MCHC RBC AUTO-ENTMCNC: 34 G/DL (ref 32–36)
MCV RBC AUTO: 92 FL (ref 80–100)
NRBC BLD-RTO: 0 /100 WBCS (ref 0–0)
PHOSPHATE SERPL-MCNC: 2.2 MG/DL (ref 2.5–4.9)
PLATELET # BLD AUTO: 179 X10*3/UL (ref 150–450)
POTASSIUM SERPL-SCNC: 3.9 MMOL/L (ref 3.5–5.3)
PROT SERPL-MCNC: 6.5 G/DL (ref 6.4–8.2)
RBC # BLD AUTO: 4.65 X10*6/UL (ref 4.5–5.9)
SODIUM SERPL-SCNC: 134 MMOL/L (ref 136–145)
WBC # BLD AUTO: 15.6 X10*3/UL (ref 4.4–11.3)

## 2024-09-23 PROCEDURE — 82040 ASSAY OF SERUM ALBUMIN: CPT | Performed by: SURGERY

## 2024-09-23 PROCEDURE — 36415 COLL VENOUS BLD VENIPUNCTURE: CPT | Performed by: SURGERY

## 2024-09-23 PROCEDURE — 82947 ASSAY GLUCOSE BLOOD QUANT: CPT

## 2024-09-23 PROCEDURE — 85027 COMPLETE CBC AUTOMATED: CPT | Performed by: SURGERY

## 2024-09-23 PROCEDURE — 74018 RADEX ABDOMEN 1 VIEW: CPT

## 2024-09-23 PROCEDURE — 83735 ASSAY OF MAGNESIUM: CPT | Performed by: SURGERY

## 2024-09-23 PROCEDURE — 84100 ASSAY OF PHOSPHORUS: CPT | Performed by: SURGERY

## 2024-09-23 PROCEDURE — 2500000001 HC RX 250 WO HCPCS SELF ADMINISTERED DRUGS (ALT 637 FOR MEDICARE OP): Performed by: SURGERY

## 2024-09-23 PROCEDURE — 99233 SBSQ HOSP IP/OBS HIGH 50: CPT

## 2024-09-23 PROCEDURE — 2500000004 HC RX 250 GENERAL PHARMACY W/ HCPCS (ALT 636 FOR OP/ED): Performed by: SURGERY

## 2024-09-23 PROCEDURE — 2500000001 HC RX 250 WO HCPCS SELF ADMINISTERED DRUGS (ALT 637 FOR MEDICARE OP): Performed by: STUDENT IN AN ORGANIZED HEALTH CARE EDUCATION/TRAINING PROGRAM

## 2024-09-23 PROCEDURE — 99231 SBSQ HOSP IP/OBS SF/LOW 25: CPT | Performed by: NURSE PRACTITIONER

## 2024-09-23 PROCEDURE — 84075 ASSAY ALKALINE PHOSPHATASE: CPT | Performed by: SURGERY

## 2024-09-23 PROCEDURE — 1200000002 HC GENERAL ROOM WITH TELEMETRY DAILY

## 2024-09-23 PROCEDURE — 2500000004 HC RX 250 GENERAL PHARMACY W/ HCPCS (ALT 636 FOR OP/ED)

## 2024-09-23 PROCEDURE — 2500000004 HC RX 250 GENERAL PHARMACY W/ HCPCS (ALT 636 FOR OP/ED): Performed by: STUDENT IN AN ORGANIZED HEALTH CARE EDUCATION/TRAINING PROGRAM

## 2024-09-23 PROCEDURE — 74018 RADEX ABDOMEN 1 VIEW: CPT | Performed by: RADIOLOGY

## 2024-09-23 RX ORDER — SENNOSIDES 8.6 MG/1
1 TABLET ORAL NIGHTLY
Status: DISCONTINUED | OUTPATIENT
Start: 2024-09-23 | End: 2024-09-23

## 2024-09-23 RX ORDER — POLYETHYLENE GLYCOL 3350 17 G/17G
17 POWDER, FOR SOLUTION ORAL DAILY
Status: DISCONTINUED | OUTPATIENT
Start: 2024-09-23 | End: 2024-09-23

## 2024-09-23 RX ORDER — MAGNESIUM SULFATE HEPTAHYDRATE 40 MG/ML
2 INJECTION, SOLUTION INTRAVENOUS ONCE
Status: COMPLETED | OUTPATIENT
Start: 2024-09-23 | End: 2024-09-23

## 2024-09-23 RX ORDER — AMOXICILLIN 250 MG
2 CAPSULE ORAL 2 TIMES DAILY
Status: DISCONTINUED | OUTPATIENT
Start: 2024-09-23 | End: 2024-09-26 | Stop reason: HOSPADM

## 2024-09-23 RX ORDER — SENNOSIDES 8.6 MG/1
1 TABLET ORAL 2 TIMES DAILY
Status: DISCONTINUED | OUTPATIENT
Start: 2024-09-23 | End: 2024-09-23

## 2024-09-23 RX ORDER — POLYETHYLENE GLYCOL 3350 17 G/17G
17 POWDER, FOR SOLUTION ORAL 2 TIMES DAILY
Status: DISCONTINUED | OUTPATIENT
Start: 2024-09-23 | End: 2024-09-26 | Stop reason: HOSPADM

## 2024-09-23 RX ADMIN — SENNOSIDES AND DOCUSATE SODIUM 2 TABLET: 50; 8.6 TABLET ORAL at 20:54

## 2024-09-23 RX ADMIN — PIPERACILLIN SODIUM AND TAZOBACTAM SODIUM 3.38 G: 3; .375 INJECTION, SOLUTION INTRAVENOUS at 05:49

## 2024-09-23 RX ADMIN — HYDROMORPHONE HYDROCHLORIDE 1 MG: 1 INJECTION, SOLUTION INTRAMUSCULAR; INTRAVENOUS; SUBCUTANEOUS at 15:02

## 2024-09-23 RX ADMIN — MAGNESIUM SULFATE HEPTAHYDRATE 2 G: 40 INJECTION, SOLUTION INTRAVENOUS at 20:13

## 2024-09-23 RX ADMIN — ATORVASTATIN CALCIUM 40 MG: 40 TABLET, FILM COATED ORAL at 20:54

## 2024-09-23 RX ADMIN — PIPERACILLIN SODIUM AND TAZOBACTAM SODIUM 3.38 G: 3; .375 INJECTION, SOLUTION INTRAVENOUS at 22:45

## 2024-09-23 RX ADMIN — ONDANSETRON 4 MG: 2 INJECTION INTRAMUSCULAR; INTRAVENOUS at 08:59

## 2024-09-23 RX ADMIN — PIPERACILLIN SODIUM AND TAZOBACTAM SODIUM 3.38 G: 3; .375 INJECTION, SOLUTION INTRAVENOUS at 15:03

## 2024-09-23 RX ADMIN — SODIUM CHLORIDE, POTASSIUM CHLORIDE, SODIUM LACTATE AND CALCIUM CHLORIDE 125 ML/HR: 600; 310; 30; 20 INJECTION, SOLUTION INTRAVENOUS at 18:47

## 2024-09-23 RX ADMIN — SODIUM CHLORIDE, POTASSIUM CHLORIDE, SODIUM LACTATE AND CALCIUM CHLORIDE 125 ML/HR: 600; 310; 30; 20 INJECTION, SOLUTION INTRAVENOUS at 11:10

## 2024-09-23 RX ADMIN — SODIUM CHLORIDE, POTASSIUM CHLORIDE, SODIUM LACTATE AND CALCIUM CHLORIDE 125 ML/HR: 600; 310; 30; 20 INJECTION, SOLUTION INTRAVENOUS at 00:46

## 2024-09-23 RX ADMIN — POLYETHYLENE GLYCOL 3350 17 G: 17 POWDER, FOR SOLUTION ORAL at 20:54

## 2024-09-23 RX ADMIN — HYDROMORPHONE HYDROCHLORIDE 1 MG: 1 INJECTION, SOLUTION INTRAMUSCULAR; INTRAVENOUS; SUBCUTANEOUS at 08:54

## 2024-09-23 RX ADMIN — ONDANSETRON 4 MG: 2 INJECTION INTRAMUSCULAR; INTRAVENOUS at 15:01

## 2024-09-23 ASSESSMENT — COGNITIVE AND FUNCTIONAL STATUS - GENERAL
DAILY ACTIVITIY SCORE: 24
MOBILITY SCORE: 24
MOBILITY SCORE: 24
DAILY ACTIVITIY SCORE: 24

## 2024-09-23 ASSESSMENT — ACTIVITIES OF DAILY LIVING (ADL): LACK_OF_TRANSPORTATION: NO

## 2024-09-23 ASSESSMENT — PAIN SCALES - GENERAL
PAINLEVEL_OUTOF10: 0 - NO PAIN
PAINLEVEL_OUTOF10: 8
PAINLEVEL_OUTOF10: 7
PAINLEVEL_OUTOF10: 1
PAINLEVEL_OUTOF10: 2

## 2024-09-23 ASSESSMENT — PAIN DESCRIPTION - LOCATION
LOCATION: ABDOMEN
LOCATION: ABDOMEN

## 2024-09-23 NOTE — PROGRESS NOTES
Brian WILDE Gabo 74 y.o. male    Subjective  Patient seen and examined this morning.  Denies nausea and vomiting.  No fever or chills.  Not passing flatus and no BM as of yet.  Tolerating clear liquids. Has not been out of bed since surgery.  States his abdomen feels bloated.  Urinating without difficulty.  No acute events overnight.     Objective  PHYSICAL EXAM:  Physical Exam  Vitals reviewed.   Constitutional:       General: He is awake.      Appearance: Normal appearance.   Cardiovascular:      Rate and Rhythm: Normal rate and regular rhythm.      Pulses: Normal pulses.      Heart sounds: Normal heart sounds.   Pulmonary:      Effort: Pulmonary effort is normal.      Breath sounds: Normal breath sounds and air entry.   Abdominal:      General: Abdomen is flat. There is distension.      Palpations: Abdomen is soft.      Tenderness: There is abdominal tenderness. There is no guarding or rebound.      Comments: Soft, distended.  Incisions well approximated, C/D/I.  MAEVE drain with SS drainage.    Musculoskeletal:         General: Normal range of motion.      Cervical back: Normal range of motion.   Skin:     General: Skin is warm and dry.   Neurological:      General: No focal deficit present.      Mental Status: He is alert and oriented to person, place, and time.   Psychiatric:         Behavior: Behavior is cooperative.         Vital signs in last 24 hours:  Vitals:    09/23/24 0800   BP: 135/79   Pulse: 95   Resp: 18   Temp: 36.2 °C (97.2 °F)   SpO2: 91%         Intake/Output this shift:    Intake/Output Summary (Last 24 hours) at 9/23/2024 0955  Last data filed at 9/22/2024 2042  Gross per 24 hour   Intake 2132.08 ml   Output 160 ml   Net 1972.08 ml        Allergies:  Allergies   Allergen Reactions    Aspirin Hives        Medications:  Scheduled medications  atorvastatin, 40 mg, oral, Nightly  [Held by provider] enoxaparin, 40 mg, subcutaneous, q24h  piperacillin-tazobactam, 3.375 g, intravenous,  q8h      Continuous medications  lactated Ringer's, 125 mL/hr, Last Rate: 125 mL/hr (09/23/24 0046)      PRN medications  PRN medications: HYDROmorphone, HYDROmorphone, ondansetron, traZODone       Labs:  Results for orders placed or performed during the hospital encounter of 09/21/24 (from the past 24 hour(s))   Tissue/Wound Culture/Smear    Specimen: GALLBLADDER CHOLECYSTECTOMY; Tissue   Result Value Ref Range    Gram Stain No polymorphonuclear leukocytes seen     Gram Stain No organisms seen    POCT GLUCOSE   Result Value Ref Range    POCT Glucose 198 (H) 74 - 99 mg/dL   CBC   Result Value Ref Range    WBC 18.8 (H) 4.4 - 11.3 x10*3/uL    nRBC 0.0 0.0 - 0.0 /100 WBCs    RBC 5.17 4.50 - 5.90 x10*6/uL    Hemoglobin 15.5 13.5 - 17.5 g/dL    Hematocrit 47.1 41.0 - 52.0 %    MCV 91 80 - 100 fL    MCH 30.0 26.0 - 34.0 pg    MCHC 32.9 32.0 - 36.0 g/dL    RDW 13.5 11.5 - 14.5 %    Platelets 183 150 - 450 x10*3/uL   CBC   Result Value Ref Range    WBC 18.9 (H) 4.4 - 11.3 x10*3/uL    nRBC 0.0 0.0 - 0.0 /100 WBCs    RBC 5.10 4.50 - 5.90 x10*6/uL    Hemoglobin 15.2 13.5 - 17.5 g/dL    Hematocrit 46.5 41.0 - 52.0 %    MCV 91 80 - 100 fL    MCH 29.8 26.0 - 34.0 pg    MCHC 32.7 32.0 - 36.0 g/dL    RDW 13.4 11.5 - 14.5 %    Platelets 188 150 - 450 x10*3/uL   POCT GLUCOSE   Result Value Ref Range    POCT Glucose 216 (H) 74 - 99 mg/dL   CBC   Result Value Ref Range    WBC 15.6 (H) 4.4 - 11.3 x10*3/uL    nRBC 0.0 0.0 - 0.0 /100 WBCs    RBC 4.65 4.50 - 5.90 x10*6/uL    Hemoglobin 14.6 13.5 - 17.5 g/dL    Hematocrit 42.9 41.0 - 52.0 %    MCV 92 80 - 100 fL    MCH 31.4 26.0 - 34.0 pg    MCHC 34.0 32.0 - 36.0 g/dL    RDW 13.4 11.5 - 14.5 %    Platelets 179 150 - 450 x10*3/uL   Magnesium   Result Value Ref Range    Magnesium 1.44 (L) 1.60 - 2.40 mg/dL   Renal Function Panel   Result Value Ref Range    Glucose 196 (H) 74 - 99 mg/dL    Sodium 134 (L) 136 - 145 mmol/L    Potassium 3.9 3.5 - 5.3 mmol/L    Chloride 103 98 - 107 mmol/L     Bicarbonate 23 21 - 32 mmol/L    Anion Gap 12 10 - 20 mmol/L    Urea Nitrogen 14 6 - 23 mg/dL    Creatinine 0.98 0.50 - 1.30 mg/dL    eGFR 82 >60 mL/min/1.73m*2    Calcium 8.7 8.6 - 10.3 mg/dL    Phosphorus 2.2 (L) 2.5 - 4.9 mg/dL    Albumin 3.7 3.4 - 5.0 g/dL   Hepatic function panel   Result Value Ref Range    Albumin 3.6 3.4 - 5.0 g/dL    Bilirubin, Total 2.0 (H) 0.0 - 1.2 mg/dL    Bilirubin, Direct 0.6 (H) 0.0 - 0.3 mg/dL    Alkaline Phosphatase 48 33 - 136 U/L    ALT 28 10 - 52 U/L    AST 33 9 - 39 U/L    Total Protein 6.5 6.4 - 8.2 g/dL   POCT GLUCOSE   Result Value Ref Range    POCT Glucose 199 (H) 74 - 99 mg/dL        Imaging:  ECG 12 lead    Result Date: 9/23/2024  Normal sinus rhythm Left axis deviation Low voltage QRS Inferior infarct (cited on or before 10-FEB-2021) Abnormal ECG When compared with ECG of 02-MAR-2022 09:28, Premature atrial complexes are no longer Present Criteria for Anterolateral infarct are no longer Present    CT angio chest abdomen pelvis    Result Date: 9/22/2024  Interpreted By:  Renita Payton, STUDY: CT ANGIO CHEST ABDOMEN PELVIS;  9/22/2024 7:00 am   INDICATION: Signs/Symptoms:concern for dissection     COMPARISON: CT abdomen pelvis 09/22/2024 at 1:11 a.m.   ACCESSION NUMBER(S): [id number]   ORDERING CLINICIAN: BK VELEZ   TECHNIQUE: CT of the chest, abdomen, and pelvis was performed before and after intravenous contrast. Patient received 90 mL of Omnipaque 350 intravenously. Contiguous axial images were obtained at  5 mm slice thickness through the chest, and at  3 mm through the abdomen and pelvis. Coronal and sagittal reconstructions at  3 mm slice thickness were performed.   CT angiography was performed through the chest, abdomen, and pelvis. 3D reconstructions were performed as well as coronal and sagittal MIP images   FINDINGS: CHEST: Thyroid gland unremarkable. No aortic aneurysm or aortic dissection. No mediastinal hematoma no CT signs of pulmonary embolism.  Severe atherosclerotic coronary artery calcifications are seen. No adenopathy. Spurring noted in the thoracic spine. Dependent atelectasis is seen in the lung bases. Mild subpleural scarring in the left lower lobe posteriorly and laterally as well. No infiltrates or effusions. Left gynecomastia seen axial image 122. There is circumferential wall thickening of the esophagus which can be seen with esophagitis   CT angiogram of the abdominal aorta and branches: The abdominal aorta is normal in size with atherosclerotic calcification. No sign of aortic dissection or aortic aneurysm. The celiac artery, superior mesenteric artery, and inferior mesenteric artery are patent. Common iliac arteries as well as internal and external iliac arteries are patent with atherosclerotic calcification noted. Right common femoral artery is patent with atherosclerotic plaque. The left common femoral artery is occluded axial image 572. The distal left common femoral artery is reconstituted at the junction of the profunda femoris and superficial femoral artery.   Abdomen: Gallbladder is distended and contains sludge. No intrahepatic biliary distention. Common bile duct is nondilated. The liver, spleen,, and adrenal glands appear normal. The kidneys show no hydronephrosis and appear normal. No bowel obstruction, free fluid or free air. There are a few scattered diverticula throughout the colon.   Pelvis: The appendix is elongated with the proximal portion containing air and an elongated fluid-filled distal portion having a short axis measurement of 10 mm. There is no periappendiceal abscess. No periappendiceal inflammatory changes at this time. There is extensive sigmoid diverticulosis but no definite sign of diverticulitis. A moderate right inguinal hernia fat with a knuckle of sigmoid colon extending toward the hernia. There is a smaller fat containing left inguinal hernia. Prostate gland is enlarged and contains a few calcifications.  Urinary bladder shows mild wall thickening. Degenerative changes noted throughout the spine.         1. The appendix is elongated with the distal half of the appendix being mildly distended and fluid-filled measuring 10 mm short axis. Clinical correlation is needed if there is concern for acute appendicitis. No periappendiceal abscess noted. Consider surgical consultation. 2. Severe atherosclerotic coronary artery calcifications 3. Distended gallbladder containing sludge 4. 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 No aortic aneurysm or aortic dissection. 5. Colonic diverticulosis most severe in the sigmoid colon but no sign of diverticulitis 6. Bilateral fat containing inguinal hernias right worse than left 7. Diffuse wall thickening of the esophagus possibly due to esophagitis.   MACRO: Critical Finding:  See findings. Notification was initiated on 9/22/2024 at 7:36 am by  Renita Payton.  (**-OCF-**) Instructions:   Signed by: Renita Payton 9/22/2024 7:37 AM Dictation workstation:   [id number]    CT abdomen pelvis w IV contrast    Result Date: 9/22/2024  Interpreted By:  David Lance, STUDY: CT ABDOMEN PELVIS W IV CONTRAST;  9/22/2024 1:15 am   INDICATION: Signs/Symptoms:Epigastric and central abdominal pain x 4 hours. Nausea and vomiting.  No prior abdominal surgeries..   COMPARISON: None.   ACCESSION NUMBER(S): [id number]   ORDERING CLINICIAN: SANDIP GALLEGO   TECHNIQUE: Contiguous axial images of the abdomen and pelvis were obtained after the intravenous administration of iodinated contrast. Coronal and sagittal reformatted images were reconstructed from the axial data.   FINDINGS: There is sludge dependent portion of the gallbladder. No wall thickening or pericholecystic fluid.   The liver, adrenals, pancreas and spleen are unremarkable.   Kidneys intact without hydronephrosis or hydroureter. Bladder within normal limits.   No  bowel obstruction. No appendicitis. There is diverticulosis without diverticulitis.   No free fluid or significant adenopathy.   There is a fat containing right inguinal hernia which also partially contains a loop of small bowel. No incarceration/obstruction.   Calcification of the prostate which is mildly enlarged.   Incidental note made of segmental occlusion of the left common femoral artery which is suspected.   Moderate calcification aorta without aneurysm. Severe coronary artery calcification is incidentally seen in the chest.   Mild to moderate spondylotic degeneration seen axial skeleton.       Sludge seen dependent portion of the gallbladder without definite evidence of acute cholecystitis. If there is clinical concern consider HIDA scan.   Additional findings as above.     MACRO: None.   Signed by: David Lance 9/22/2024 1:53 AM Dictation workstation:   [id number]      Plan  Biliary sludge     #Chronic appendicitis  #Gangrenous cholecystitis  #Non-recurrent bilateral inguinal hernia without obstruction or gangrene    POD#1:  S/p   Laparoscopic Appendectomy   2. Cholecystectomy Laparoscopy  Modifier 22 due to gangrene, extreme inflammation with fixation of liver to abdominal wall, and short cystic duct with cystic artery takeoff directly over visualized CBD.    - surgery as above  - avss  - Diet: continue clear liquids   - Pain control  - Nausea: antiemetics PRN  - Encourage OOB and IS  - Lovenox on DVT prophylaxis - on hold   - Maintain MAEVE drain  - IVF   - ABX: on zosyn     - Daily labs    Plan of care discussed with Dr. Ravin Moe, APRN-CNP    I spent 15 minutes in the professional and overall care of this patient.

## 2024-09-23 NOTE — CARE PLAN
The patient's goals for the shift include  remain  afebrile    The clinical goals for the shift include pain control    Problem: Pain - Adult  Goal: Verbalizes/displays adequate comfort level or baseline comfort level  Outcome: Progressing     Problem: Safety - Adult  Goal: Free from fall injury  Outcome: Progressing     Problem: Discharge Planning  Goal: Discharge to home or other facility with appropriate resources  Outcome: Progressing     Problem: Chronic Conditions and Co-morbidities  Goal: Patient's chronic conditions and co-morbidity symptoms are monitored and maintained or improved  Outcome: Progressing     Problem: Fall/Injury  Goal: Not fall by end of shift  Outcome: Progressing  Goal: Be free from injury by end of the shift  Outcome: Progressing  Goal: Verbalize understanding of personal risk factors for fall in the hospital  Outcome: Progressing  Goal: Verbalize understanding of risk factor reduction measures to prevent injury from fall in the home  Outcome: Progressing  Goal: Use assistive devices by end of the shift  Outcome: Progressing  Goal: Pace activities to prevent fatigue by end of the shift  Outcome: Progressing     Problem: Pain  Goal: Takes deep breaths with improved pain control throughout the shift  Outcome: Progressing  Goal: Turns in bed with improved pain control throughout the shift  Outcome: Progressing  Goal: Walks with improved pain control throughout the shift  Outcome: Progressing  Goal: Performs ADL's with improved pain control throughout shift  Outcome: Progressing  Goal: Participates in PT with improved pain control throughout the shift  Outcome: Progressing  Goal: Free from opioid side effects throughout the shift  Outcome: Progressing  Goal: Free from acute confusion related to pain meds throughout the shift  Outcome: Progressing

## 2024-09-23 NOTE — NURSING NOTE
Report  was  given  to  eyal  and pt. Was  transferred  to room 3030 and  now  he  is   an in patient admit.

## 2024-09-23 NOTE — CARE PLAN
The patient's goals for the shift include get sleep     The clinical goals for the shift include pain control    Problem: Pain - Adult  Goal: Verbalizes/displays adequate comfort level or baseline comfort level  Outcome: Progressing

## 2024-09-23 NOTE — PROGRESS NOTES
09/23/24 1523   Discharge Planning   Living Arrangements Spouse/significant other   Support Systems Spouse/significant other   Assistance Needed chantell   Type of Residence Private residence   Do you have animals or pets at home? No   Expected Discharge Disposition Home   Does the patient need discharge transport arranged? No   Financial Resource Strain   How hard is it for you to pay for the very basics like food, housing, medical care, and heating? Not hard   Housing Stability   In the last 12 months, was there a time when you were not able to pay the mortgage or rent on time? N   At any time in the past 12 months, were you homeless or living in a shelter (including now)? N   Transportation Needs   In the past 12 months, has lack of transportation kept you from medical appointments or from getting medications? no   In the past 12 months, has lack of transportation kept you from meetings, work, or from getting things needed for daily living? No     Met patient at the bedside and confirmed his demographics, insurance and pcp is Dr. Keith. Patient lives with his wife and is independent with his care. He denies any financial concerns, able to purchase his medications, received education, and takes as ordered. Patient denies the need for home care at discharge.  He will discharge with the drain and he and his wife feel confident in learning to care for. Will continue to monitor for any changing needs.

## 2024-09-23 NOTE — PROGRESS NOTES
"CARDIOLOGY SERVICE   CONSULT Progress note   [place] Dr. Maame Shaw  [facility] [place]    [place] NAME: Brian Ayon  PATIENT MRN: [id number]  SERVICE DATE:  9/23/2024  SERVICE TIME: 10:03 AM    CONSULTANT: Maame Shaw MD  PRIMARY CARE PHYSICIAN: No Assigned PCP Generic Provider, MD  ATTENDING PHYSICIAN: Pancho Whitley DO      IMPRESSIONS:  S/P Appendectomy  History of ant STEMI [facility] treated with primary PCI to 2/2021  Remote PCI LAD [place] ~ 2010  PCI midLAD SNEHAL [place] 3.0x22 on 2/10/2021, on DAPT   Other CAD: osteal LM 25%, osteal LAD 50%, sub-branch of OM1 has osteal 80%, moderate distal dominant Circ , all management and close monitoring  Hypertension on Lisinopril 2.5 daily, Metoprolol tartrate 25 BID  Hyperlipidemia  on 2/7/2023 1, started Lipitor 80  Compliance, cardiac care since MRI  Card meds PTA:     RECOMMENDATIONS:  Tele  Needs OP card FU,  Discussedw pt and wife at bedside.    SIGNATURE: Maame Shaw MD   OFFICE: [phone]    ================================================================    SUBJECTIVE:  no CP    CURRENT CARDIOVASCULAR MEDICATIONS:  No current facility-administered medications on file prior to encounter.     No current outpatient medications on file prior to encounter.        PHYSICAL EXAM:  /79 (BP Location: Right arm, Patient Position: Sitting)   Pulse 95   Temp 36.2 °C (97.2 °F) (Temporal)   Resp 18   Ht 1.778 m (5' 10\")   Wt 94 kg (207 lb 3.7 oz)   SpO2 91%   BMI 29.73 kg/m²   Awake Alert   Neck: No JVD  Cardiac:  S1 S2  Resp: Clear  Ext: No peripheral edema    LABS:  Lab Results   Component Value Date    WBC 15.6 (H) 09/23/2024    HGB 14.6 09/23/2024    HCT 42.9 09/23/2024    MCV 92 09/23/2024     09/23/2024      Lab Results   Component Value Date    GLUCOSE 196 (H) 09/23/2024    CALCIUM 8.7 09/23/2024     (L) 09/23/2024    K 3.9 09/23/2024    CO2 23 09/23/2024     09/23/2024    BUN 14 09/23/2024    CREATININE 0.98 09/23/2024          "

## 2024-09-23 NOTE — PROGRESS NOTES
Brian Ayon is a 74 y.o. male on day 0 of admission presenting with Biliary sludge.    Subjective   No acute events overnight, patient vital signs stable. Patient is s/p laparoscopic appendectomy and cholecystectomy of gangrenous gallbladder. Examined patient at bedside who endorses soreness/ache in mid to right stomach. Feels extra sore with inspiration, palpitation, and was unable to get up due to inciting stomach pain. When looking at abdominal surgical areas, they appeared clean with no signs of inflammation and MAEVE drain contains dark red liquid consistent with blood. Otherwise he has no other acute complaints and denies chest pain, dyspnea, fevers, chills, dysuria, hematuria. He has some back pain he associates with laying in hospital bed and has not had a bowel movement yet since surgery.     Objective     Physical Exam  Constitutional:       Appearance: Normal appearance.   HENT:      Head: Normocephalic and atraumatic.      Mouth/Throat:      Mouth: Mucous membranes are moist.   Eyes:      Extraocular Movements: Extraocular movements intact.      Conjunctiva/sclera: Conjunctivae normal.      Pupils: Pupils are equal, round, and reactive to light.   Cardiovascular:      Rate and Rhythm: Normal rate and regular rhythm.   Pulmonary:      Effort: Pulmonary effort is normal.      Breath sounds: Normal breath sounds.      Comments: Clear to auscultation anteriorly and bilaterally in upper and lower lobes. Unable to listen to posterior back due to significant abdominal pain when trying to sit up.  Abdominal:      General: Abdomen is flat. Bowel sounds are normal.      Palpations: Abdomen is soft.   Musculoskeletal:         General: Normal range of motion.      Cervical back: Normal range of motion and neck supple.   Skin:     General: Skin is warm and dry.      Capillary Refill: Capillary refill takes less than 2 seconds.   Neurological:      General: No focal deficit present.      Mental Status: He is alert and  "oriented to person, place, and time.   Psychiatric:         Mood and Affect: Mood normal.         Behavior: Behavior normal.       Last Recorded Vitals  Blood pressure 135/79, pulse 95, temperature 36.2 °C (97.2 °F), temperature source Temporal, resp. rate 18, height 1.778 m (5' 10\"), weight 94 kg (207 lb 3.7 oz), SpO2 91%.  Intake/Output last 3 Shifts:  I/O last 3 completed shifts:  In: 3280 (34.9 mL/kg) [I.V.:1530 (16.3 mL/kg); IV Piggyback:1750]  Out: 160 (1.7 mL/kg) [Urine:150 (0 mL/kg/hr); Drains:10]  Weight: 94 kg     Relevant Results  Scheduled medications  atorvastatin, 40 mg, oral, Nightly  [Held by provider] enoxaparin, 40 mg, subcutaneous, q24h  piperacillin-tazobactam, 3.375 g, intravenous, q8h  polyethylene glycol, 17 g, oral, BID  sennosides-docusate sodium, 2 tablet, oral, BID      Continuous medications  lactated Ringer's, 125 mL/hr, Last Rate: 125 mL/hr (09/23/24 0046)      PRN medications  PRN medications: HYDROmorphone, HYDROmorphone, ondansetron, traZODone      Results for orders placed or performed during the hospital encounter of 09/21/24 (from the past 24 hour(s))   Tissue/Wound Culture/Smear    Specimen: GALLBLADDER CHOLECYSTECTOMY; Tissue   Result Value Ref Range    Gram Stain No polymorphonuclear leukocytes seen     Gram Stain No organisms seen    POCT GLUCOSE   Result Value Ref Range    POCT Glucose 198 (H) 74 - 99 mg/dL   CBC   Result Value Ref Range    WBC 18.8 (H) 4.4 - 11.3 x10*3/uL    nRBC 0.0 0.0 - 0.0 /100 WBCs    RBC 5.17 4.50 - 5.90 x10*6/uL    Hemoglobin 15.5 13.5 - 17.5 g/dL    Hematocrit 47.1 41.0 - 52.0 %    MCV 91 80 - 100 fL    MCH 30.0 26.0 - 34.0 pg    MCHC 32.9 32.0 - 36.0 g/dL    RDW 13.5 11.5 - 14.5 %    Platelets 183 150 - 450 x10*3/uL   CBC   Result Value Ref Range    WBC 18.9 (H) 4.4 - 11.3 x10*3/uL    nRBC 0.0 0.0 - 0.0 /100 WBCs    RBC 5.10 4.50 - 5.90 x10*6/uL    Hemoglobin 15.2 13.5 - 17.5 g/dL    Hematocrit 46.5 41.0 - 52.0 %    MCV 91 80 - 100 fL    MCH 29.8 " 26.0 - 34.0 pg    MCHC 32.7 32.0 - 36.0 g/dL    RDW 13.4 11.5 - 14.5 %    Platelets 188 150 - 450 x10*3/uL   POCT GLUCOSE   Result Value Ref Range    POCT Glucose 216 (H) 74 - 99 mg/dL   CBC   Result Value Ref Range    WBC 15.6 (H) 4.4 - 11.3 x10*3/uL    nRBC 0.0 0.0 - 0.0 /100 WBCs    RBC 4.65 4.50 - 5.90 x10*6/uL    Hemoglobin 14.6 13.5 - 17.5 g/dL    Hematocrit 42.9 41.0 - 52.0 %    MCV 92 80 - 100 fL    MCH 31.4 26.0 - 34.0 pg    MCHC 34.0 32.0 - 36.0 g/dL    RDW 13.4 11.5 - 14.5 %    Platelets 179 150 - 450 x10*3/uL   Magnesium   Result Value Ref Range    Magnesium 1.44 (L) 1.60 - 2.40 mg/dL   Renal Function Panel   Result Value Ref Range    Glucose 196 (H) 74 - 99 mg/dL    Sodium 134 (L) 136 - 145 mmol/L    Potassium 3.9 3.5 - 5.3 mmol/L    Chloride 103 98 - 107 mmol/L    Bicarbonate 23 21 - 32 mmol/L    Anion Gap 12 10 - 20 mmol/L    Urea Nitrogen 14 6 - 23 mg/dL    Creatinine 0.98 0.50 - 1.30 mg/dL    eGFR 82 >60 mL/min/1.73m*2    Calcium 8.7 8.6 - 10.3 mg/dL    Phosphorus 2.2 (L) 2.5 - 4.9 mg/dL    Albumin 3.7 3.4 - 5.0 g/dL   Hepatic function panel   Result Value Ref Range    Albumin 3.6 3.4 - 5.0 g/dL    Bilirubin, Total 2.0 (H) 0.0 - 1.2 mg/dL    Bilirubin, Direct 0.6 (H) 0.0 - 0.3 mg/dL    Alkaline Phosphatase 48 33 - 136 U/L    ALT 28 10 - 52 U/L    AST 33 9 - 39 U/L    Total Protein 6.5 6.4 - 8.2 g/dL   POCT GLUCOSE   Result Value Ref Range    POCT Glucose 199 (H) 74 - 99 mg/dL    ECG 12 lead    Result Date: 9/23/2024  Normal sinus rhythm Left axis deviation Low voltage QRS Inferior infarct (cited on or before 10-FEB-2021) Abnormal ECG When compared with ECG of 02-MAR-2022 09:28, Premature atrial complexes are no longer Present Criteria for Anterolateral infarct are no longer Present    CT angio chest abdomen pelvis    Result Date: 9/22/2024  Interpreted By:  Renita Payton, STUDY: CT ANGIO CHEST ABDOMEN PELVIS;  9/22/2024 7:00 am   INDICATION: Signs/Symptoms:concern for dissection     COMPARISON: CT  abdomen pelvis 09/22/2024 at 1:11 a.m.   ACCESSION NUMBER(S): [id number]   ORDERING CLINICIAN: BK VELEZ   TECHNIQUE: CT of the chest, abdomen, and pelvis was performed before and after intravenous contrast. Patient received 90 mL of Omnipaque 350 intravenously. Contiguous axial images were obtained at  5 mm slice thickness through the chest, and at  3 mm through the abdomen and pelvis. Coronal and sagittal reconstructions at  3 mm slice thickness were performed.   CT angiography was performed through the chest, abdomen, and pelvis. 3D reconstructions were performed as well as coronal and sagittal MIP images   FINDINGS: CHEST: Thyroid gland unremarkable. No aortic aneurysm or aortic dissection. No mediastinal hematoma no CT signs of pulmonary embolism. Severe atherosclerotic coronary artery calcifications are seen. No adenopathy. Spurring noted in the thoracic spine. Dependent atelectasis is seen in the lung bases. Mild subpleural scarring in the left lower lobe posteriorly and laterally as well. No infiltrates or effusions. Left gynecomastia seen axial image 122. There is circumferential wall thickening of the esophagus which can be seen with esophagitis   CT angiogram of the abdominal aorta and branches: The abdominal aorta is normal in size with atherosclerotic calcification. No sign of aortic dissection or aortic aneurysm. The celiac artery, superior mesenteric artery, and inferior mesenteric artery are patent. Common iliac arteries as well as internal and external iliac arteries are patent with atherosclerotic calcification noted. Right common femoral artery is patent with atherosclerotic plaque. The left common femoral artery is occluded axial image 572. The distal left common femoral artery is reconstituted at the junction of the profunda femoris and superficial femoral artery.   Abdomen: Gallbladder is distended and contains sludge. No intrahepatic biliary distention. Common bile duct is nondilated.  The liver, spleen,, and adrenal glands appear normal. The kidneys show no hydronephrosis and appear normal. No bowel obstruction, free fluid or free air. There are a few scattered diverticula throughout the colon.   Pelvis: The appendix is elongated with the proximal portion containing air and an elongated fluid-filled distal portion having a short axis measurement of 10 mm. There is no periappendiceal abscess. No periappendiceal inflammatory changes at this time. There is extensive sigmoid diverticulosis but no definite sign of diverticulitis. A moderate right inguinal hernia fat with a knuckle of sigmoid colon extending toward the hernia. There is a smaller fat containing left inguinal hernia. Prostate gland is enlarged and contains a few calcifications. Urinary bladder shows mild wall thickening. Degenerative changes noted throughout the spine.         1. The appendix is elongated with the distal half of the appendix being mildly distended and fluid-filled measuring 10 mm short axis. Clinical correlation is needed if there is concern for acute appendicitis. No periappendiceal abscess noted. Consider surgical consultation. 2. Severe atherosclerotic coronary artery calcifications 3. Distended gallbladder containing sludge 4. 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 No aortic aneurysm or aortic dissection. 5. Colonic diverticulosis most severe in the sigmoid colon but no sign of diverticulitis 6. Bilateral fat containing inguinal hernias right worse than left 7. Diffuse wall thickening of the esophagus possibly due to esophagitis.   MACRO: Critical Finding:  See findings. Notification was initiated on 9/22/2024 at 7:36 am by  Renita Payton.  (**-OCF-**) Instructions:   Signed by: Renita Payton 9/22/2024 7:37 AM Dictation workstation:   [id number]    CT abdomen pelvis w IV contrast    Result Date: 9/22/2024  Interpreted By:  Natalio  David, STUDY: CT ABDOMEN PELVIS W IV CONTRAST;  9/22/2024 1:15 am   INDICATION: Signs/Symptoms:Epigastric and central abdominal pain x 4 hours. Nausea and vomiting.  No prior abdominal surgeries..   COMPARISON: None.   ACCESSION NUMBER(S): [id number]   ORDERING CLINICIAN: SANDIP GALLEGO   TECHNIQUE: Contiguous axial images of the abdomen and pelvis were obtained after the intravenous administration of iodinated contrast. Coronal and sagittal reformatted images were reconstructed from the axial data.   FINDINGS: There is sludge dependent portion of the gallbladder. No wall thickening or pericholecystic fluid.   The liver, adrenals, pancreas and spleen are unremarkable.   Kidneys intact without hydronephrosis or hydroureter. Bladder within normal limits.   No bowel obstruction. No appendicitis. There is diverticulosis without diverticulitis.   No free fluid or significant adenopathy.   There is a fat containing right inguinal hernia which also partially contains a loop of small bowel. No incarceration/obstruction.   Calcification of the prostate which is mildly enlarged.   Incidental note made of segmental occlusion of the left common femoral artery which is suspected.   Moderate calcification aorta without aneurysm. Severe coronary artery calcification is incidentally seen in the chest.   Mild to moderate spondylotic degeneration seen axial skeleton.       Sludge seen dependent portion of the gallbladder without definite evidence of acute cholecystitis. If there is clinical concern consider HIDA scan.   Additional findings as above.     MACRO: None.   Signed by: David Lance 9/22/2024 1:53 AM Dictation workstation:   [id number]         Assessment/Plan   Brian Ayon is a 74 year old male with a PMHx of HTN, HLD, CAD s/p PCI w/ stent to LAD 2021 after STEMI , another stent in 2016 but unclear where who presented for severe sudden onset periumbilical abd pain, nausea and one episode of nonbilous nonbloody vomiting  while in the ED. CT imaging was remarkable for concern of biliary sludge in the gallbladder and chronic appendicitis. General surgery (Dr. Prashant Alicia) performed both a laparoscopic appendectomy and cholecystecomy of a gangrenous gallbladder. We are managing him s/p surgery and will defer to surgery team for recs prior to discharge including MAEVE removal and DVT prophylaxis.    #Biliary sludge with gangrenous gallbladder  #Chronic appendicitis with localized peritonitis, without perforation, abscess, or gangrene.  -CT imaging with c/f above.   -s/p laparoscopic appendectomy and cholecystecomy of a gangrenous gallbladder.  -Vital signs stable  -RFP stable  -WBC downtrending 18.9->15.6  -Hepatic function panel with elevated bilirubin (2.0) and direct bilirubin (0.6)  -Lipase and lactate normal  Plan:  -Pain management currently with dilaudid, plan to switch to oral pain management  -Watch drain output, currently serosanguineous drainage  -Trend labs and vitals  -On telemetry  -Replete electrolytes as needed, K+>4, Mg+>2  -General surgery on consult   Defer for MAEVE drain removal  Defer for DVT prophylaxis. Lovenox is currently held and patient is on SCDs. Can resume DVT ppx once MAEVE drainage is without bloody drainage per surgery.   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   -Hx of cardiovascular risk factors including smoking history, HTN, HLD, CAD s/p PCI w/ stent to LAD 2021 after STEMI  -Troponins normal  -A1c 7.1%  -Lipid panel result of elevated cholesterol (210) and LDL (152)  -ASCVD score 29% for 10 year cardiovascular event  risk  -Follow up outpatient for proper management and prevention of ACS    CHRONIC PROBLEMS:  # CAD s/p PCI  # Hypertension  # Hyperlipidemia  -continue home meds as appropriate    BEATRICE DELANEY MS3    I saw the patient with the above medical student and performed my own History and Physical Examination. My Subjective and Objective findings are reflected in the above documentation. The Assessment and Plan reflect my input. My Edits are included in the above documentation.     Discussed with attending,  Robert Dyer DO PGY-2  Internal Medicine

## 2024-09-24 LAB
ALBUMIN SERPL BCP-MCNC: 3.6 G/DL (ref 3.4–5)
ANION GAP SERPL CALC-SCNC: 11 MMOL/L (ref 10–20)
BACTERIA SPEC CULT: NORMAL
BUN SERPL-MCNC: 12 MG/DL (ref 6–23)
CALCIUM SERPL-MCNC: 8.9 MG/DL (ref 8.6–10.3)
CHLORIDE SERPL-SCNC: 102 MMOL/L (ref 98–107)
CO2 SERPL-SCNC: 23 MMOL/L (ref 21–32)
CREAT SERPL-MCNC: 0.84 MG/DL (ref 0.5–1.3)
EGFRCR SERPLBLD CKD-EPI 2021: >90 ML/MIN/1.73M*2
ERYTHROCYTE [DISTWIDTH] IN BLOOD BY AUTOMATED COUNT: 13.3 % (ref 11.5–14.5)
GLUCOSE SERPL-MCNC: 163 MG/DL (ref 74–99)
GRAM STN SPEC: NORMAL
GRAM STN SPEC: NORMAL
HCT VFR BLD AUTO: 42.5 % (ref 41–52)
HGB BLD-MCNC: 14.5 G/DL (ref 13.5–17.5)
MAGNESIUM SERPL-MCNC: 1.82 MG/DL (ref 1.6–2.4)
MCH RBC QN AUTO: 31.2 PG (ref 26–34)
MCHC RBC AUTO-ENTMCNC: 34.1 G/DL (ref 32–36)
MCV RBC AUTO: 91 FL (ref 80–100)
NRBC BLD-RTO: 0 /100 WBCS (ref 0–0)
PHOSPHATE SERPL-MCNC: 2 MG/DL (ref 2.5–4.9)
PLATELET # BLD AUTO: 189 X10*3/UL (ref 150–450)
POTASSIUM SERPL-SCNC: 3.7 MMOL/L (ref 3.5–5.3)
RBC # BLD AUTO: 4.65 X10*6/UL (ref 4.5–5.9)
SODIUM SERPL-SCNC: 132 MMOL/L (ref 136–145)
WBC # BLD AUTO: 13.4 X10*3/UL (ref 4.4–11.3)

## 2024-09-24 PROCEDURE — 99233 SBSQ HOSP IP/OBS HIGH 50: CPT

## 2024-09-24 PROCEDURE — 2500000004 HC RX 250 GENERAL PHARMACY W/ HCPCS (ALT 636 FOR OP/ED): Performed by: SURGERY

## 2024-09-24 PROCEDURE — 2500000001 HC RX 250 WO HCPCS SELF ADMINISTERED DRUGS (ALT 637 FOR MEDICARE OP): Performed by: SURGERY

## 2024-09-24 PROCEDURE — 2500000001 HC RX 250 WO HCPCS SELF ADMINISTERED DRUGS (ALT 637 FOR MEDICARE OP)

## 2024-09-24 PROCEDURE — 83735 ASSAY OF MAGNESIUM: CPT | Performed by: SURGERY

## 2024-09-24 PROCEDURE — 2500000001 HC RX 250 WO HCPCS SELF ADMINISTERED DRUGS (ALT 637 FOR MEDICARE OP): Performed by: STUDENT IN AN ORGANIZED HEALTH CARE EDUCATION/TRAINING PROGRAM

## 2024-09-24 PROCEDURE — 99231 SBSQ HOSP IP/OBS SF/LOW 25: CPT | Performed by: NURSE PRACTITIONER

## 2024-09-24 PROCEDURE — 36415 COLL VENOUS BLD VENIPUNCTURE: CPT | Performed by: SURGERY

## 2024-09-24 PROCEDURE — 85027 COMPLETE CBC AUTOMATED: CPT | Performed by: SURGERY

## 2024-09-24 PROCEDURE — 80069 RENAL FUNCTION PANEL: CPT | Performed by: SURGERY

## 2024-09-24 PROCEDURE — 1200000002 HC GENERAL ROOM WITH TELEMETRY DAILY

## 2024-09-24 PROCEDURE — 2500000004 HC RX 250 GENERAL PHARMACY W/ HCPCS (ALT 636 FOR OP/ED): Performed by: STUDENT IN AN ORGANIZED HEALTH CARE EDUCATION/TRAINING PROGRAM

## 2024-09-24 PROCEDURE — 2500000004 HC RX 250 GENERAL PHARMACY W/ HCPCS (ALT 636 FOR OP/ED): Performed by: NURSE PRACTITIONER

## 2024-09-24 RX ORDER — ENOXAPARIN SODIUM 100 MG/ML
40 INJECTION SUBCUTANEOUS EVERY 24 HOURS
Status: DISCONTINUED | OUTPATIENT
Start: 2024-09-24 | End: 2024-09-26 | Stop reason: HOSPADM

## 2024-09-24 RX ORDER — KETOROLAC TROMETHAMINE 15 MG/ML
15 INJECTION, SOLUTION INTRAMUSCULAR; INTRAVENOUS EVERY 8 HOURS SCHEDULED
Status: DISPENSED | OUTPATIENT
Start: 2024-09-24 | End: 2024-09-25

## 2024-09-24 RX ORDER — SODIUM,POTASSIUM PHOSPHATES 280-250MG
1 POWDER IN PACKET (EA) ORAL 4 TIMES DAILY
Status: DISCONTINUED | OUTPATIENT
Start: 2024-09-24 | End: 2024-09-24 | Stop reason: ALTCHOICE

## 2024-09-24 RX ORDER — ACETAMINOPHEN 325 MG/1
650 TABLET ORAL EVERY 6 HOURS
Status: DISCONTINUED | OUTPATIENT
Start: 2024-09-24 | End: 2024-09-26 | Stop reason: HOSPADM

## 2024-09-24 RX ORDER — METOPROLOL TARTRATE 25 MG/1
25 TABLET, FILM COATED ORAL 2 TIMES DAILY
Status: DISCONTINUED | OUTPATIENT
Start: 2024-09-24 | End: 2024-09-26 | Stop reason: HOSPADM

## 2024-09-24 RX ADMIN — KETOROLAC TROMETHAMINE 15 MG: 15 INJECTION, SOLUTION INTRAMUSCULAR; INTRAVENOUS at 14:09

## 2024-09-24 RX ADMIN — Medication 500 MG: at 14:09

## 2024-09-24 RX ADMIN — SENNOSIDES AND DOCUSATE SODIUM 2 TABLET: 50; 8.6 TABLET ORAL at 22:14

## 2024-09-24 RX ADMIN — SENNOSIDES AND DOCUSATE SODIUM 2 TABLET: 50; 8.6 TABLET ORAL at 08:07

## 2024-09-24 RX ADMIN — ACETAMINOPHEN 650 MG: 325 TABLET ORAL at 11:13

## 2024-09-24 RX ADMIN — PIPERACILLIN SODIUM AND TAZOBACTAM SODIUM 3.38 G: 3; .375 INJECTION, SOLUTION INTRAVENOUS at 06:30

## 2024-09-24 RX ADMIN — POLYETHYLENE GLYCOL 3350 17 G: 17 POWDER, FOR SOLUTION ORAL at 22:14

## 2024-09-24 RX ADMIN — Medication 500 MG: at 22:14

## 2024-09-24 RX ADMIN — Medication 500 MG: at 16:50

## 2024-09-24 RX ADMIN — ENOXAPARIN SODIUM 40 MG: 40 INJECTION SUBCUTANEOUS at 14:09

## 2024-09-24 RX ADMIN — ONDANSETRON 4 MG: 2 INJECTION INTRAMUSCULAR; INTRAVENOUS at 04:02

## 2024-09-24 RX ADMIN — KETOROLAC TROMETHAMINE 15 MG: 15 INJECTION, SOLUTION INTRAMUSCULAR; INTRAVENOUS at 22:14

## 2024-09-24 RX ADMIN — SODIUM CHLORIDE, POTASSIUM CHLORIDE, SODIUM LACTATE AND CALCIUM CHLORIDE 125 ML/HR: 600; 310; 30; 20 INJECTION, SOLUTION INTRAVENOUS at 06:30

## 2024-09-24 RX ADMIN — METOPROLOL TARTRATE 25 MG: 25 TABLET, FILM COATED ORAL at 22:16

## 2024-09-24 RX ADMIN — POLYETHYLENE GLYCOL 3350 17 G: 17 POWDER, FOR SOLUTION ORAL at 08:07

## 2024-09-24 RX ADMIN — ATORVASTATIN CALCIUM 40 MG: 40 TABLET, FILM COATED ORAL at 22:14

## 2024-09-24 RX ADMIN — SODIUM CHLORIDE, POTASSIUM CHLORIDE, SODIUM LACTATE AND CALCIUM CHLORIDE 125 ML/HR: 600; 310; 30; 20 INJECTION, SOLUTION INTRAVENOUS at 01:37

## 2024-09-24 RX ADMIN — PIPERACILLIN SODIUM AND TAZOBACTAM SODIUM 3.38 G: 3; .375 INJECTION, SOLUTION INTRAVENOUS at 22:14

## 2024-09-24 RX ADMIN — ACETAMINOPHEN 650 MG: 325 TABLET ORAL at 16:50

## 2024-09-24 RX ADMIN — ACETAMINOPHEN 650 MG: 325 TABLET ORAL at 22:15

## 2024-09-24 RX ADMIN — PIPERACILLIN SODIUM AND TAZOBACTAM SODIUM 3.38 G: 3; .375 INJECTION, SOLUTION INTRAVENOUS at 14:09

## 2024-09-24 ASSESSMENT — COGNITIVE AND FUNCTIONAL STATUS - GENERAL
MOBILITY SCORE: 24
DAILY ACTIVITIY SCORE: 24
DAILY ACTIVITIY SCORE: 24
MOBILITY SCORE: 24

## 2024-09-24 ASSESSMENT — PAIN - FUNCTIONAL ASSESSMENT
PAIN_FUNCTIONAL_ASSESSMENT: 0-10
PAIN_FUNCTIONAL_ASSESSMENT: 0-10

## 2024-09-24 ASSESSMENT — PAIN SCALES - GENERAL
PAINLEVEL_OUTOF10: 0 - NO PAIN
PAINLEVEL_OUTOF10: 0 - NO PAIN

## 2024-09-24 NOTE — PROGRESS NOTES
"Brian Ayon is a 74 y.o. male on day 1 of admission presenting with Biliary sludge.    Subjective   Overnight, patient had an episode of nausea without vomiting which resolved after taking Zofran. Today, patient is feeling much better. Still has complaints of pain in the abdomen. He states that he has been passing only flatus. He denies any lightheadedness, dizziness, chest pain, nausea, vomiting, diarrhea or constipation.      Objective     Physical Exam  Constitutional:       Appearance: Normal appearance. He is normal weight.   HENT:      Nose: Nose normal. No congestion or rhinorrhea.   Eyes:      Conjunctiva/sclera: Conjunctivae normal.      Pupils: Pupils are equal, round, and reactive to light.   Cardiovascular:      Rate and Rhythm: Normal rate and regular rhythm.      Pulses: Normal pulses.      Heart sounds: Normal heart sounds. No murmur heard.     No friction rub.   Pulmonary:      Effort: Pulmonary effort is normal.      Breath sounds: Normal breath sounds.   Abdominal:      General: Bowel sounds are normal. There is distension.      Tenderness: There is abdominal tenderness.      Comments: Mild distension with tenderness to deep palpation   Musculoskeletal:         General: Normal range of motion.      Right lower leg: No edema.      Left lower leg: No edema.   Skin:     General: Skin is warm and dry.   Neurological:      General: No focal deficit present.      Mental Status: He is alert and oriented to person, place, and time. Mental status is at baseline.   Psychiatric:         Mood and Affect: Mood normal.         Behavior: Behavior normal.         Thought Content: Thought content normal.         Last Recorded Vitals  Blood pressure 148/90, pulse 97, temperature 36.6 °C (97.9 °F), temperature source Temporal, resp. rate 18, height 1.778 m (5' 10\"), weight 94 kg (207 lb 3.7 oz), SpO2 94%.  Intake/Output last 3 Shifts:  I/O last 3 completed shifts:  In: 3012.1 (32 mL/kg) [P.O.:660; I.V.:2302.1 (24.5 " mL/kg); IV Piggyback:50]  Out: 400 (4.3 mL/kg) [Urine:250 (0.1 mL/kg/hr); Drains:150]  Weight: 94 kg     Relevant Results  Scheduled medications  atorvastatin, 40 mg, oral, Nightly  [Held by provider] enoxaparin, 40 mg, subcutaneous, q24h  piperacillin-tazobactam, 3.375 g, intravenous, q8h  polyethylene glycol, 17 g, oral, BID  sennosides-docusate sodium, 2 tablet, oral, BID      Continuous medications     PRN medications  PRN medications: HYDROmorphone, HYDROmorphone, ondansetron, traZODone         Results for orders placed or performed during the hospital encounter of 09/21/24 (from the past 24 hour(s))   Magnesium   Result Value Ref Range    Magnesium 1.82 1.60 - 2.40 mg/dL   Renal Function Panel   Result Value Ref Range    Glucose 163 (H) 74 - 99 mg/dL    Sodium 132 (L) 136 - 145 mmol/L    Potassium 3.7 3.5 - 5.3 mmol/L    Chloride 102 98 - 107 mmol/L    Bicarbonate 23 21 - 32 mmol/L    Anion Gap 11 10 - 20 mmol/L    Urea Nitrogen 12 6 - 23 mg/dL    Creatinine 0.84 0.50 - 1.30 mg/dL    eGFR >90 >60 mL/min/1.73m*2    Calcium 8.9 8.6 - 10.3 mg/dL    Phosphorus 2.0 (L) 2.5 - 4.9 mg/dL    Albumin 3.6 3.4 - 5.0 g/dL   CBC   Result Value Ref Range    WBC 13.4 (H) 4.4 - 11.3 x10*3/uL    nRBC 0.0 0.0 - 0.0 /100 WBCs    RBC 4.65 4.50 - 5.90 x10*6/uL    Hemoglobin 14.5 13.5 - 17.5 g/dL    Hematocrit 42.5 41.0 - 52.0 %    MCV 91 80 - 100 fL    MCH 31.2 26.0 - 34.0 pg    MCHC 34.1 32.0 - 36.0 g/dL    RDW 13.3 11.5 - 14.5 %    Platelets 189 150 - 450 x10*3/uL      XR abdomen 1 view    Result Date: 9/23/2024  Interpreted By:  Donich, Renita, STUDY: XR ABDOMEN 1 VIEW;  9/23/2024 8:26 pm   INDICATION: Signs/Symptoms:Post Op Ileus.     COMPARISON: None.   ACCESSION NUMBER(S): [id number]   ORDERING CLINICIAN: YUMIKO BURRIS   FINDINGS: 2 supine views of the abdomen show right upper quadrant surgical clips. Gas is noted in the stomach, right colon and transverse colon. Small bowel is unremarkable. A drainage catheter is  suspected in the right abdomen.       1.  Mild gaseous distention of the colon and stomach possibly due to ileus. Recommend follow-up and clinical correlation   MACRO: None   Signed by: Renita Payton 9/23/2024 8:58 PM Dictation workstation:   [id number]    ECG 12 lead    Result Date: 9/23/2024  Normal sinus rhythm Left axis deviation Low voltage QRS Inferior infarct (cited on or before 10-FEB-2021) Abnormal ECG When compared with ECG of 02-MAR-2022 09:28, Premature atrial complexes are no longer Present Criteria for Anterolateral infarct are no longer Present    CT angio chest abdomen pelvis    Result Date: 9/22/2024  Interpreted By:  Renita Payton, STUDY: CT ANGIO CHEST ABDOMEN PELVIS;  9/22/2024 7:00 am   INDICATION: Signs/Symptoms:concern for dissection     COMPARISON: CT abdomen pelvis 09/22/2024 at 1:11 a.m.   ACCESSION NUMBER(S): [id number]   ORDERING CLINICIAN: BK VELEZ   TECHNIQUE: CT of the chest, abdomen, and pelvis was performed before and after intravenous contrast. Patient received 90 mL of Omnipaque 350 intravenously. Contiguous axial images were obtained at  5 mm slice thickness through the chest, and at  3 mm through the abdomen and pelvis. Coronal and sagittal reconstructions at  3 mm slice thickness were performed.   CT angiography was performed through the chest, abdomen, and pelvis. 3D reconstructions were performed as well as coronal and sagittal MIP images   FINDINGS: CHEST: Thyroid gland unremarkable. No aortic aneurysm or aortic dissection. No mediastinal hematoma no CT signs of pulmonary embolism. Severe atherosclerotic coronary artery calcifications are seen. No adenopathy. Spurring noted in the thoracic spine. Dependent atelectasis is seen in the lung bases. Mild subpleural scarring in the left lower lobe posteriorly and laterally as well. No infiltrates or effusions. Left gynecomastia seen axial image 122. There is circumferential wall thickening of the esophagus which can be  seen with esophagitis   CT angiogram of the abdominal aorta and branches: The abdominal aorta is normal in size with atherosclerotic calcification. No sign of aortic dissection or aortic aneurysm. The celiac artery, superior mesenteric artery, and inferior mesenteric artery are patent. Common iliac arteries as well as internal and external iliac arteries are patent with atherosclerotic calcification noted. Right common femoral artery is patent with atherosclerotic plaque. The left common femoral artery is occluded axial image 572. The distal left common femoral artery is reconstituted at the junction of the profunda femoris and superficial femoral artery.   Abdomen: Gallbladder is distended and contains sludge. No intrahepatic biliary distention. Common bile duct is nondilated. The liver, spleen,, and adrenal glands appear normal. The kidneys show no hydronephrosis and appear normal. No bowel obstruction, free fluid or free air. There are a few scattered diverticula throughout the colon.   Pelvis: The appendix is elongated with the proximal portion containing air and an elongated fluid-filled distal portion having a short axis measurement of 10 mm. There is no periappendiceal abscess. No periappendiceal inflammatory changes at this time. There is extensive sigmoid diverticulosis but no definite sign of diverticulitis. A moderate right inguinal hernia fat with a knuckle of sigmoid colon extending toward the hernia. There is a smaller fat containing left inguinal hernia. Prostate gland is enlarged and contains a few calcifications. Urinary bladder shows mild wall thickening. Degenerative changes noted throughout the spine.         1. The appendix is elongated with the distal half of the appendix being mildly distended and fluid-filled measuring 10 mm short axis. Clinical correlation is needed if there is concern for acute appendicitis. No periappendiceal abscess noted. Consider surgical consultation. 2. Severe  atherosclerotic coronary artery calcifications 3. Distended gallbladder containing sludge 4. 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 No aortic aneurysm or aortic dissection. 5. Colonic diverticulosis most severe in the sigmoid colon but no sign of diverticulitis 6. Bilateral fat containing inguinal hernias right worse than left 7. Diffuse wall thickening of the esophagus possibly due to esophagitis.   MACRO: Critical Finding:  See findings. Notification was initiated on 9/22/2024 at 7:36 am by  Renita Payton.  (**-OCF-**) Instructions:   Signed by: Renita Payton 9/22/2024 7:37 AM Dictation workstation:   [id number]    CT abdomen pelvis w IV contrast    Result Date: 9/22/2024  Interpreted By:  David Lance, STUDY: CT ABDOMEN PELVIS W IV CONTRAST;  9/22/2024 1:15 am   INDICATION: Signs/Symptoms:Epigastric and central abdominal pain x 4 hours. Nausea and vomiting.  No prior abdominal surgeries..   COMPARISON: None.   ACCESSION NUMBER(S): [id number]   ORDERING CLINICIAN: SANDIP GALLEGO   TECHNIQUE: Contiguous axial images of the abdomen and pelvis were obtained after the intravenous administration of iodinated contrast. Coronal and sagittal reformatted images were reconstructed from the axial data.   FINDINGS: There is sludge dependent portion of the gallbladder. No wall thickening or pericholecystic fluid.   The liver, adrenals, pancreas and spleen are unremarkable.   Kidneys intact without hydronephrosis or hydroureter. Bladder within normal limits.   No bowel obstruction. No appendicitis. There is diverticulosis without diverticulitis.   No free fluid or significant adenopathy.   There is a fat containing right inguinal hernia which also partially contains a loop of small bowel. No incarceration/obstruction.   Calcification of the prostate which is mildly enlarged.   Incidental note made of segmental occlusion of the left common  femoral artery which is suspected.   Moderate calcification aorta without aneurysm. Severe coronary artery calcification is incidentally seen in the chest.   Mild to moderate spondylotic degeneration seen axial skeleton.       Sludge seen dependent portion of the gallbladder without definite evidence of acute cholecystitis. If there is clinical concern consider HIDA scan.   Additional findings as above.     MACRO: None.   Signed by: David Lance 9/22/2024 1:53 AM Dictation workstation:   [id number]         Assessment/Plan    Brian Ayon is a 74 year old male with a PMHx of HTN, HLD, CAD s/p PCI w/ stent to LAD 2021 after STEMI , another stent in 2016 but unclear where who presented for severe sudden onset periumbilical abd pain, nausea and one episode of nonbilous nonbloody vomiting while in the ED. CT imaging was remarkable for concern of biliary sludge in the gallbladder and chronic appendicitis. General surgery (Dr. Prashant Alicia) performed both a laparoscopic appendectomy and cholecystecomy of a gangrenous gallbladder. We are managing him s/p surgery and will defer to surgery team for recs prior to discharge including MAEVE removal and DVT prophylaxis.     #Biliary sludge with gangrenous gallbladder  #Chronic appendicitis with localized peritonitis, without perforation, abscess, or gangrene.  -CT imaging with c/f above.   -s/p laparoscopic appendectomy and cholecystecomy of a gangrenous gallbladder.  -Vital signs stable  -RFP stable  -WBC downtrending 15.6->13.4  -Hepatic function panel with elevated bilirubin (2.0) and direct bilirubin (0.6)  -Lipase and lactate normal  Plan:  -Pain management currently with dilaudid, plan to switch to oral pain management  -Watch drain output, currently serosanguineous drainage  -Trend labs and vitals  -On telemetry  -Replete electrolytes as needed, K+>4, Mg+>2  -General surgery on consult              Defer for MAEVE drain removal  Defer for DVT prophylaxis. Lovenox is  currently held and patient is on SCDs. Can resume DVT ppx once MAEVE drainage is without bloody drainage per surgery.   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   -Hx of cardiovascular risk factors including smoking history, HTN, HLD, CAD s/p PCI w/ stent to LAD 2021 after STEMI  -Troponins normal  -A1c 7.1%  -Lipid panel result of elevated cholesterol (210) and LDL (152)  -ASCVD score 29% for 10 year cardiovascular event risk  -Follow up outpatient for proper management and prevention of ACS     CHRONIC PROBLEMS:  # CAD s/p PCI  # Hypertension  # Hyperlipidemia  -continue home meds as appropriate     Ivory Jimenes, MS3

## 2024-09-24 NOTE — PROGRESS NOTES
Brian WILDE Gabo 74 y.o. male    Subjective  Patient seen and examined this morning.  Reports some intermittent nausea, no vomiting.  No fever or chills.  Reports passing flatus, no BM as of yet.  Pain controlled.  Urinating without difficulty.  Up ambulating, currently in chair. Reports belching and still feels bloated. Denies CP and SOB.  No acute events overnight.     Objective  PHYSICAL EXAM:  Physical Exam  Vitals reviewed.   Constitutional:       General: He is awake.      Appearance: Normal appearance.   Cardiovascular:      Rate and Rhythm: Normal rate and regular rhythm.      Pulses: Normal pulses.      Heart sounds: Normal heart sounds.   Pulmonary:      Effort: Pulmonary effort is normal.      Breath sounds: Normal air entry. Rales present.   Abdominal:      General: Abdomen is flat. There is distension.      Palpations: Abdomen is soft.      Tenderness: There is abdominal tenderness. There is no guarding or rebound.      Comments: Soft, distended.  Incisions well approximated, C/D/I.  MAEVE drain with serous drainage.     Musculoskeletal:         General: Normal range of motion.      Cervical back: Normal range of motion.   Skin:     General: Skin is warm and dry.   Neurological:      General: No focal deficit present.      Mental Status: He is alert and oriented to person, place, and time.   Psychiatric:         Behavior: Behavior is cooperative.         Vital signs in last 24 hours:  Vitals:    09/24/24 0800   BP: 148/90   Pulse: 97   Resp: 18   Temp: 36.6 °C (97.9 °F)   SpO2: 94%         Intake/Output this shift:    Intake/Output Summary (Last 24 hours) at 9/24/2024 1039  Last data filed at 9/24/2024 1024  Gross per 24 hour   Intake 3528.33 ml   Output 470 ml   Net 3058.33 ml        Allergies:  Allergies   Allergen Reactions    Aspirin Hives        Medications:  Scheduled medications  atorvastatin, 40 mg, oral, Nightly  [Held by provider] enoxaparin, 40 mg, subcutaneous, q24h  piperacillin-tazobactam,  3.375 g, intravenous, q8h  polyethylene glycol, 17 g, oral, BID  sennosides-docusate sodium, 2 tablet, oral, BID      Continuous medications     PRN medications  PRN medications: HYDROmorphone, HYDROmorphone, ondansetron, traZODone       Labs:  Results for orders placed or performed during the hospital encounter of 09/21/24 (from the past 24 hour(s))   Magnesium   Result Value Ref Range    Magnesium 1.82 1.60 - 2.40 mg/dL   Renal Function Panel   Result Value Ref Range    Glucose 163 (H) 74 - 99 mg/dL    Sodium 132 (L) 136 - 145 mmol/L    Potassium 3.7 3.5 - 5.3 mmol/L    Chloride 102 98 - 107 mmol/L    Bicarbonate 23 21 - 32 mmol/L    Anion Gap 11 10 - 20 mmol/L    Urea Nitrogen 12 6 - 23 mg/dL    Creatinine 0.84 0.50 - 1.30 mg/dL    eGFR >90 >60 mL/min/1.73m*2    Calcium 8.9 8.6 - 10.3 mg/dL    Phosphorus 2.0 (L) 2.5 - 4.9 mg/dL    Albumin 3.6 3.4 - 5.0 g/dL   CBC   Result Value Ref Range    WBC 13.4 (H) 4.4 - 11.3 x10*3/uL    nRBC 0.0 0.0 - 0.0 /100 WBCs    RBC 4.65 4.50 - 5.90 x10*6/uL    Hemoglobin 14.5 13.5 - 17.5 g/dL    Hematocrit 42.5 41.0 - 52.0 %    MCV 91 80 - 100 fL    MCH 31.2 26.0 - 34.0 pg    MCHC 34.1 32.0 - 36.0 g/dL    RDW 13.3 11.5 - 14.5 %    Platelets 189 150 - 450 x10*3/uL        Imaging:  XR abdomen 1 view    Result Date: 9/23/2024  Interpreted By:  Renita Payton, STUDY: XR ABDOMEN 1 VIEW;  9/23/2024 8:26 pm   INDICATION: Signs/Symptoms:Post Op Ileus.     COMPARISON: None.   ACCESSION NUMBER(S): [id number]   ORDERING CLINICIAN: YUMIKO BURRIS   FINDINGS: 2 supine views of the abdomen show right upper quadrant surgical clips. Gas is noted in the stomach, right colon and transverse colon. Small bowel is unremarkable. A drainage catheter is suspected in the right abdomen.       1.  Mild gaseous distention of the colon and stomach possibly due to ileus. Recommend follow-up and clinical correlation   MACRO: None   Signed by: Renita Payton 9/23/2024 8:58 PM Dictation workstation:   [id number]       Plan  Biliary sludge     POD#2:  S/p Laparoscopic Appendectomy and Cholecystectomy Laparoscopy     #Possible ileus   - surgery as above  - avss  - Diet: NPO for now until return of bowel function   - Pain control try to limit narcotics;.  Will add scheduled tylenol and IV toradol 15mg Q8 x24/hrs.   - Nausea: antiemetics PRN  - Encourage OOB and IS  - Lovenox on DVT prophylaxis - OK to resume from surgery standpoint   - Maintain MAEVE drain  - IVF   - ABX: on zosyn      - Daily labs     Plan of care discussed with Dr. Ravin Moe, APRN-CNP    I spent 20 minutes in the professional and overall care of this patient.

## 2024-09-24 NOTE — NURSING NOTE
Abdomen remains distended,not passing flatus/no bm. Stat kub done at the bedside. Serum mag level 1.44 with am labs, mag bolus ordered and infusing.

## 2024-09-24 NOTE — PROGRESS NOTES
Brian Ayon is a 74 y.o. male on day 1 of admission presenting with Biliary sludge.    Subjective   Overnight, patient had an episode of nausea without vomiting which resolved after taking Zofran. Today, patient is feeling much better. Still has complaints of pain in the abdomen. He states that he has been passing only flatus, no bowel movements. The patient was changed to NPO last night due to concern for ileus. He denies any lightheadedness, dizziness, chest pain, nausea, vomiting, diarrhea or constipation.      Objective     Physical Exam  Constitutional:       Appearance: Normal appearance. He is normal weight.   HENT:      Nose: Nose normal. No congestion or rhinorrhea.   Eyes:      Conjunctiva/sclera: Conjunctivae normal.      Pupils: Pupils are equal, round, and reactive to light.   Cardiovascular:      Rate and Rhythm: Normal rate and regular rhythm.      Pulses: Normal pulses.      Heart sounds: Normal heart sounds. No murmur heard.     No friction rub.   Pulmonary:      Effort: Pulmonary effort is normal.      Breath sounds: Normal breath sounds.   Abdominal:      General: Bowel sounds are normal. There is distension.      Tenderness: There is abdominal tenderness.      Comments: Mild distension with tenderness to deep palpation. Dressings not removed, no evidence of drainage. MAEVE drain in place, draining serosanguineous fluid.   Musculoskeletal:         General: Normal range of motion.      Right lower leg: No edema.      Left lower leg: No edema.   Skin:     General: Skin is warm and dry.   Neurological:      General: No focal deficit present.      Mental Status: He is alert and oriented to person, place, and time. Mental status is at baseline.   Psychiatric:         Mood and Affect: Mood normal.         Behavior: Behavior normal.         Thought Content: Thought content normal.         Last Recorded Vitals  Blood pressure 148/90, pulse 97, temperature 36.6 °C (97.9 °F), temperature source Temporal,  "resp. rate 18, height 1.778 m (5' 10\"), weight 94 kg (207 lb 3.7 oz), SpO2 94%.  Intake/Output last 3 Shifts:  I/O last 3 completed shifts:  In: 3012.1 (32 mL/kg) [P.O.:660; I.V.:2302.1 (24.5 mL/kg); IV Piggyback:50]  Out: 400 (4.3 mL/kg) [Urine:250 (0.1 mL/kg/hr); Drains:150]  Weight: 94 kg     Relevant Results  Scheduled medications  atorvastatin, 40 mg, oral, Nightly  [Held by provider] enoxaparin, 40 mg, subcutaneous, q24h  piperacillin-tazobactam, 3.375 g, intravenous, q8h  polyethylene glycol, 17 g, oral, BID  sennosides-docusate sodium, 2 tablet, oral, BID      Continuous medications     PRN medications  PRN medications: HYDROmorphone, HYDROmorphone, ondansetron, traZODone         Results for orders placed or performed during the hospital encounter of 09/21/24 (from the past 24 hour(s))   Magnesium   Result Value Ref Range    Magnesium 1.82 1.60 - 2.40 mg/dL   Renal Function Panel   Result Value Ref Range    Glucose 163 (H) 74 - 99 mg/dL    Sodium 132 (L) 136 - 145 mmol/L    Potassium 3.7 3.5 - 5.3 mmol/L    Chloride 102 98 - 107 mmol/L    Bicarbonate 23 21 - 32 mmol/L    Anion Gap 11 10 - 20 mmol/L    Urea Nitrogen 12 6 - 23 mg/dL    Creatinine 0.84 0.50 - 1.30 mg/dL    eGFR >90 >60 mL/min/1.73m*2    Calcium 8.9 8.6 - 10.3 mg/dL    Phosphorus 2.0 (L) 2.5 - 4.9 mg/dL    Albumin 3.6 3.4 - 5.0 g/dL   CBC   Result Value Ref Range    WBC 13.4 (H) 4.4 - 11.3 x10*3/uL    nRBC 0.0 0.0 - 0.0 /100 WBCs    RBC 4.65 4.50 - 5.90 x10*6/uL    Hemoglobin 14.5 13.5 - 17.5 g/dL    Hematocrit 42.5 41.0 - 52.0 %    MCV 91 80 - 100 fL    MCH 31.2 26.0 - 34.0 pg    MCHC 34.1 32.0 - 36.0 g/dL    RDW 13.3 11.5 - 14.5 %    Platelets 189 150 - 450 x10*3/uL      XR abdomen 1 view    Result Date: 9/23/2024  Interpreted By:  Renita Payton, STUDY: XR ABDOMEN 1 VIEW;  9/23/2024 8:26 pm   INDICATION: Signs/Symptoms:Post Op Ileus.     COMPARISON: None.   ACCESSION NUMBER(S): [id number]   ORDERING CLINICIAN: YUMIKO BURRIS   FINDINGS: 2 " supine views of the abdomen show right upper quadrant surgical clips. Gas is noted in the stomach, right colon and transverse colon. Small bowel is unremarkable. A drainage catheter is suspected in the right abdomen.       1.  Mild gaseous distention of the colon and stomach possibly due to ileus. Recommend follow-up and clinical correlation   MACRO: None   Signed by: Renita Payton 9/23/2024 8:58 PM Dictation workstation:   [id number]    ECG 12 lead    Result Date: 9/23/2024  Normal sinus rhythm Left axis deviation Low voltage QRS Inferior infarct (cited on or before 10-FEB-2021) Abnormal ECG When compared with ECG of 02-MAR-2022 09:28, Premature atrial complexes are no longer Present Criteria for Anterolateral infarct are no longer Present    CT angio chest abdomen pelvis    Result Date: 9/22/2024  Interpreted By:  Renita Payton, STUDY: CT ANGIO CHEST ABDOMEN PELVIS;  9/22/2024 7:00 am   INDICATION: Signs/Symptoms:concern for dissection     COMPARISON: CT abdomen pelvis 09/22/2024 at 1:11 a.m.   ACCESSION NUMBER(S): [id number]   ORDERING CLINICIAN: BK VELEZ   TECHNIQUE: CT of the chest, abdomen, and pelvis was performed before and after intravenous contrast. Patient received 90 mL of Omnipaque 350 intravenously. Contiguous axial images were obtained at  5 mm slice thickness through the chest, and at  3 mm through the abdomen and pelvis. Coronal and sagittal reconstructions at  3 mm slice thickness were performed.   CT angiography was performed through the chest, abdomen, and pelvis. 3D reconstructions were performed as well as coronal and sagittal MIP images   FINDINGS: CHEST: Thyroid gland unremarkable. No aortic aneurysm or aortic dissection. No mediastinal hematoma no CT signs of pulmonary embolism. Severe atherosclerotic coronary artery calcifications are seen. No adenopathy. Spurring noted in the thoracic spine. Dependent atelectasis is seen in the lung bases. Mild subpleural scarring in the left  lower lobe posteriorly and laterally as well. No infiltrates or effusions. Left gynecomastia seen axial image 122. There is circumferential wall thickening of the esophagus which can be seen with esophagitis   CT angiogram of the abdominal aorta and branches: The abdominal aorta is normal in size with atherosclerotic calcification. No sign of aortic dissection or aortic aneurysm. The celiac artery, superior mesenteric artery, and inferior mesenteric artery are patent. Common iliac arteries as well as internal and external iliac arteries are patent with atherosclerotic calcification noted. Right common femoral artery is patent with atherosclerotic plaque. The left common femoral artery is occluded axial image 572. The distal left common femoral artery is reconstituted at the junction of the profunda femoris and superficial femoral artery.   Abdomen: Gallbladder is distended and contains sludge. No intrahepatic biliary distention. Common bile duct is nondilated. The liver, spleen,, and adrenal glands appear normal. The kidneys show no hydronephrosis and appear normal. No bowel obstruction, free fluid or free air. There are a few scattered diverticula throughout the colon.   Pelvis: The appendix is elongated with the proximal portion containing air and an elongated fluid-filled distal portion having a short axis measurement of 10 mm. There is no periappendiceal abscess. No periappendiceal inflammatory changes at this time. There is extensive sigmoid diverticulosis but no definite sign of diverticulitis. A moderate right inguinal hernia fat with a knuckle of sigmoid colon extending toward the hernia. There is a smaller fat containing left inguinal hernia. Prostate gland is enlarged and contains a few calcifications. Urinary bladder shows mild wall thickening. Degenerative changes noted throughout the spine.         1. The appendix is elongated with the distal half of the appendix being mildly distended and fluid-filled  measuring 10 mm short axis. Clinical correlation is needed if there is concern for acute appendicitis. No periappendiceal abscess noted. Consider surgical consultation. 2. Severe atherosclerotic coronary artery calcifications 3. Distended gallbladder containing sludge 4. 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 No aortic aneurysm or aortic dissection. 5. Colonic diverticulosis most severe in the sigmoid colon but no sign of diverticulitis 6. Bilateral fat containing inguinal hernias right worse than left 7. Diffuse wall thickening of the esophagus possibly due to esophagitis.   MACRO: Critical Finding:  See findings. Notification was initiated on 9/22/2024 at 7:36 am by  Renita Payton.  (**-OCF-**) Instructions:   Signed by: Renita Payton 9/22/2024 7:37 AM Dictation workstation:   [id number]    CT abdomen pelvis w IV contrast    Result Date: 9/22/2024  Interpreted By:  David Lance, STUDY: CT ABDOMEN PELVIS W IV CONTRAST;  9/22/2024 1:15 am   INDICATION: Signs/Symptoms:Epigastric and central abdominal pain x 4 hours. Nausea and vomiting.  No prior abdominal surgeries..   COMPARISON: None.   ACCESSION NUMBER(S): [id number]   ORDERING CLINICIAN: SANDIP GALLEGO   TECHNIQUE: Contiguous axial images of the abdomen and pelvis were obtained after the intravenous administration of iodinated contrast. Coronal and sagittal reformatted images were reconstructed from the axial data.   FINDINGS: There is sludge dependent portion of the gallbladder. No wall thickening or pericholecystic fluid.   The liver, adrenals, pancreas and spleen are unremarkable.   Kidneys intact without hydronephrosis or hydroureter. Bladder within normal limits.   No bowel obstruction. No appendicitis. There is diverticulosis without diverticulitis.   No free fluid or significant adenopathy.   There is a fat containing right inguinal hernia which also partially contains a  loop of small bowel. No incarceration/obstruction.   Calcification of the prostate which is mildly enlarged.   Incidental note made of segmental occlusion of the left common femoral artery which is suspected.   Moderate calcification aorta without aneurysm. Severe coronary artery calcification is incidentally seen in the chest.   Mild to moderate spondylotic degeneration seen axial skeleton.       Sludge seen dependent portion of the gallbladder without definite evidence of acute cholecystitis. If there is clinical concern consider HIDA scan.   Additional findings as above.     MACRO: None.   Signed by: David Lance 9/22/2024 1:53 AM Dictation workstation:   [id number]         Assessment/Plan    Brian Ayon is a 74 year old male with a PMHx of HTN, HLD, CAD s/p PCI w/ stent to LAD 2021 after STEMI , another stent in 2016 but unclear where who presented for severe sudden onset periumbilical abd pain, nausea and one episode of nonbilous nonbloody vomiting while in the ED. CT imaging was remarkable for concern of biliary sludge in the gallbladder and chronic appendicitis. General surgery (Dr. Prashant Alicia) performed both a laparoscopic appendectomy and cholecystecomy of a gangrenous gallbladder. We are managing him s/p surgery and will defer to surgery team for recs prior to discharge including MAEVE removal and DVT prophylaxis.     #Biliary sludge with gangrenous gallbladder  #Chronic appendicitis with localized peritonitis, without perforation, abscess, or gangrene.  -CT imaging with c/f above.   -s/p laparoscopic appendectomy and cholecystecomy of a gangrenous gallbladder.  -Vital signs stable  -RFP stable  -WBC downtrending 15.6->13.4  -Hepatic function panel with elevated bilirubin (2.0) and direct bilirubin (0.6)  -Lipase and lactate normal  Plan:  -Pain management: Tylenol and IV toradol added, try to limit narcotics. IV dilaudid for breakthrough.   -Watch drain output, currently serosanguineous  drainage  -Trend labs and vitals  -On telemetry  -Replete electrolytes as needed, K+>4, Mg+>2  -General surgery on consult              Defer for MAEVE drain removal  Defer for DVT prophylaxis. Lovenox is currently held and patient is on SCDs. Can resume DVT ppx once MAEVE drainage is without bloody drainage per surgery.   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   -Hx of cardiovascular risk factors including smoking history, HTN, HLD, CAD s/p PCI w/ stent to LAD 2021 after STEMI  -Troponins normal  -A1c 7.1%  -Lipid panel result of elevated cholesterol (210) and LDL (152)  -ASCVD score 29% for 10 year cardiovascular event risk  -Follow up outpatient for proper management and prevention of ACS     CHRONIC PROBLEMS:  # CAD s/p PCI  # Hypertension  # Hyperlipidemia  -continue home meds as appropriate     Ivory Jimenes, MS3      RESIDENT ADDENDUM    Patient was seen and examined independently of Student Doctor Yudy. I agree with the above documentation, and my edits are directly reflected in the text. Patient's care was discussed with senior resident and attending physician.    Gloria Okeefe, DO  Internal Medicine PGY-1 Resident

## 2024-09-24 NOTE — CARE PLAN
The patient's goals for the shift include      The clinical goals for the shift include pain control      Problem: Pain - Adult  Goal: Verbalizes/displays adequate comfort level or baseline comfort level  Outcome: Progressing     Problem: Safety - Adult  Goal: Free from fall injury  Outcome: Progressing     Problem: Discharge Planning  Goal: Discharge to home or other facility with appropriate resources  Outcome: Progressing     Problem: Chronic Conditions and Co-morbidities  Goal: Patient's chronic conditions and co-morbidity symptoms are monitored and maintained or improved  Outcome: Progressing     Problem: Fall/Injury  Goal: Not fall by end of shift  Outcome: Progressing  Goal: Be free from injury by end of the shift  Outcome: Progressing  Goal: Verbalize understanding of personal risk factors for fall in the hospital  Outcome: Progressing  Goal: Verbalize understanding of risk factor reduction measures to prevent injury from fall in the home  Outcome: Progressing  Goal: Use assistive devices by end of the shift  Outcome: Progressing  Goal: Pace activities to prevent fatigue by end of the shift  Outcome: Progressing     Problem: Pain  Goal: Takes deep breaths with improved pain control throughout the shift  Outcome: Progressing  Goal: Turns in bed with improved pain control throughout the shift  Outcome: Progressing  Goal: Walks with improved pain control throughout the shift  Outcome: Progressing  Goal: Performs ADL's with improved pain control throughout shift  Outcome: Progressing  Goal: Participates in PT with improved pain control throughout the shift  Outcome: Progressing  Goal: Free from opioid side effects throughout the shift  Outcome: Progressing  Goal: Free from acute confusion related to pain meds throughout the shift  Outcome: Progressing

## 2024-09-24 NOTE — NURSING NOTE
EOS note: No acute changes this shift. Remains Aox4, room air, on tele- NSR, independent in room. No complaints of pain. Pt remains NPO; except sips with meds. MAEVE drain remain in place with serosanguinous drainage. Continuous fluids discontinued. Lovenox unheld. Continued to obtain b/ps manually. Pt started on scheduled PO tylenol and IV Toradol. Continued IV abx. Pt ambulated in avalos multiple times this shift. Continues to pass flautus, but no BM this shift. Pt resting comfortably in chair with call light within reach at this time.

## 2024-09-24 NOTE — NURSING NOTE
Pt now started passing gas. Had one episode nausea overnight relieved by zofran. He has denied any abdominal pain. He has slept up in the chair. Encouraged to ambulate. Wife is at bedside. He is currently npo,he and wife aware. Ivf and ivatb infusing as ordered.

## 2024-09-25 ENCOUNTER — APPOINTMENT (OUTPATIENT)
Dept: RADIOLOGY | Facility: HOSPITAL | Age: 74
End: 2024-09-25
Payer: MEDICARE

## 2024-09-25 LAB
ALBUMIN SERPL BCP-MCNC: 3.6 G/DL (ref 3.4–5)
ANION GAP SERPL CALC-SCNC: 14 MMOL/L (ref 10–20)
ATRIAL RATE: 77 BPM
BUN SERPL-MCNC: 19 MG/DL (ref 6–23)
CALCIUM SERPL-MCNC: 9 MG/DL (ref 8.6–10.3)
CHLORIDE SERPL-SCNC: 102 MMOL/L (ref 98–107)
CO2 SERPL-SCNC: 22 MMOL/L (ref 21–32)
CREAT SERPL-MCNC: 0.95 MG/DL (ref 0.5–1.3)
EGFRCR SERPLBLD CKD-EPI 2021: 85 ML/MIN/1.73M*2
ERYTHROCYTE [DISTWIDTH] IN BLOOD BY AUTOMATED COUNT: 13.1 % (ref 11.5–14.5)
GLUCOSE SERPL-MCNC: 132 MG/DL (ref 74–99)
HCT VFR BLD AUTO: 46.8 % (ref 41–52)
HGB BLD-MCNC: 15.4 G/DL (ref 13.5–17.5)
MAGNESIUM SERPL-MCNC: 1.79 MG/DL (ref 1.6–2.4)
MCH RBC QN AUTO: 29.6 PG (ref 26–34)
MCHC RBC AUTO-ENTMCNC: 32.9 G/DL (ref 32–36)
MCV RBC AUTO: 90 FL (ref 80–100)
NRBC BLD-RTO: 0 /100 WBCS (ref 0–0)
P AXIS: 65 DEGREES
P OFFSET: 199 MS
P ONSET: 144 MS
PHOSPHATE SERPL-MCNC: 2.9 MG/DL (ref 2.5–4.9)
PLATELET # BLD AUTO: 216 X10*3/UL (ref 150–450)
POTASSIUM SERPL-SCNC: 3.3 MMOL/L (ref 3.5–5.3)
PR INTERVAL: 150 MS
Q ONSET: 219 MS
QRS COUNT: 13 BEATS
QRS DURATION: 86 MS
QT INTERVAL: 374 MS
QTC CALCULATION(BAZETT): 423 MS
QTC FREDERICIA: 406 MS
R AXIS: -50 DEGREES
RBC # BLD AUTO: 5.2 X10*6/UL (ref 4.5–5.9)
SODIUM SERPL-SCNC: 135 MMOL/L (ref 136–145)
T AXIS: 37 DEGREES
T OFFSET: 406 MS
VENTRICULAR RATE: 77 BPM
WBC # BLD AUTO: 11.1 X10*3/UL (ref 4.4–11.3)

## 2024-09-25 PROCEDURE — 2500000004 HC RX 250 GENERAL PHARMACY W/ HCPCS (ALT 636 FOR OP/ED): Performed by: SURGERY

## 2024-09-25 PROCEDURE — 2500000004 HC RX 250 GENERAL PHARMACY W/ HCPCS (ALT 636 FOR OP/ED): Performed by: STUDENT IN AN ORGANIZED HEALTH CARE EDUCATION/TRAINING PROGRAM

## 2024-09-25 PROCEDURE — 74018 RADEX ABDOMEN 1 VIEW: CPT

## 2024-09-25 PROCEDURE — 2500000004 HC RX 250 GENERAL PHARMACY W/ HCPCS (ALT 636 FOR OP/ED)

## 2024-09-25 PROCEDURE — 83735 ASSAY OF MAGNESIUM: CPT

## 2024-09-25 PROCEDURE — 80069 RENAL FUNCTION PANEL: CPT

## 2024-09-25 PROCEDURE — 85027 COMPLETE CBC AUTOMATED: CPT

## 2024-09-25 PROCEDURE — 99231 SBSQ HOSP IP/OBS SF/LOW 25: CPT | Performed by: NURSE PRACTITIONER

## 2024-09-25 PROCEDURE — 1200000002 HC GENERAL ROOM WITH TELEMETRY DAILY

## 2024-09-25 PROCEDURE — 99233 SBSQ HOSP IP/OBS HIGH 50: CPT

## 2024-09-25 PROCEDURE — 2500000001 HC RX 250 WO HCPCS SELF ADMINISTERED DRUGS (ALT 637 FOR MEDICARE OP)

## 2024-09-25 PROCEDURE — 36415 COLL VENOUS BLD VENIPUNCTURE: CPT

## 2024-09-25 PROCEDURE — 74018 RADEX ABDOMEN 1 VIEW: CPT | Performed by: RADIOLOGY

## 2024-09-25 RX ORDER — POTASSIUM CHLORIDE 1.5 G/1.58G
20 POWDER, FOR SOLUTION ORAL ONCE
Status: COMPLETED | OUTPATIENT
Start: 2024-09-25 | End: 2024-09-25

## 2024-09-25 RX ORDER — METOCLOPRAMIDE HYDROCHLORIDE 5 MG/ML
10 INJECTION INTRAMUSCULAR; INTRAVENOUS EVERY 6 HOURS PRN
Status: DISCONTINUED | OUTPATIENT
Start: 2024-09-25 | End: 2024-09-26 | Stop reason: HOSPADM

## 2024-09-25 RX ORDER — SCOLOPAMINE TRANSDERMAL SYSTEM 1 MG/1
1 PATCH, EXTENDED RELEASE TRANSDERMAL
Status: DISCONTINUED | OUTPATIENT
Start: 2024-09-25 | End: 2024-09-26

## 2024-09-25 RX ADMIN — POTASSIUM CHLORIDE 20 MEQ: 1.5 POWDER, FOR SOLUTION ORAL at 16:23

## 2024-09-25 RX ADMIN — METOCLOPRAMIDE 10 MG: 5 INJECTION, SOLUTION INTRAMUSCULAR; INTRAVENOUS at 06:43

## 2024-09-25 RX ADMIN — ENOXAPARIN SODIUM 40 MG: 40 INJECTION SUBCUTANEOUS at 16:23

## 2024-09-25 RX ADMIN — METOPROLOL TARTRATE 25 MG: 25 TABLET, FILM COATED ORAL at 09:36

## 2024-09-25 RX ADMIN — SCOPOLAMINE 1 PATCH: 1.5 PATCH, EXTENDED RELEASE TRANSDERMAL at 06:43

## 2024-09-25 RX ADMIN — PIPERACILLIN SODIUM AND TAZOBACTAM SODIUM 3.38 G: 3; .375 INJECTION, SOLUTION INTRAVENOUS at 06:21

## 2024-09-25 RX ADMIN — ACETAMINOPHEN 650 MG: 325 TABLET ORAL at 06:21

## 2024-09-25 RX ADMIN — ONDANSETRON 4 MG: 2 INJECTION INTRAMUSCULAR; INTRAVENOUS at 03:38

## 2024-09-25 RX ADMIN — Medication 500 MG: at 06:21

## 2024-09-25 RX ADMIN — PIPERACILLIN SODIUM AND TAZOBACTAM SODIUM 3.38 G: 3; .375 INJECTION, SOLUTION INTRAVENOUS at 16:23

## 2024-09-25 ASSESSMENT — COGNITIVE AND FUNCTIONAL STATUS - GENERAL
DAILY ACTIVITIY SCORE: 24
MOBILITY SCORE: 24

## 2024-09-25 ASSESSMENT — PAIN SCALES - GENERAL: PAINLEVEL_OUTOF10: 3

## 2024-09-25 NOTE — NURSING NOTE
9/25/2024    Patient started on full liquid diet. Abdominal pain managed without medications at this time. Patient has had multiple loose bowel movements this shift.

## 2024-09-25 NOTE — PROGRESS NOTES
Brian WILDE Gabo 74 y.o. male    Subjective  Patient seen and examined this morning.  Reports some nausea without emesis. No fever or chills. Currently NPO.  Pain controlled.  Reports passing flatus and had several bowel movements overnight. Urinating well. Up ambulating. No acute events overnight.    Objective  PHYSICAL EXAM:  Physical Exam  Vitals reviewed.   Constitutional:       General: He is awake.      Appearance: Normal appearance.   Cardiovascular:      Rate and Rhythm: Normal rate and regular rhythm.      Pulses: Normal pulses.      Heart sounds: Normal heart sounds.   Pulmonary:      Effort: Pulmonary effort is normal.      Breath sounds: Normal air entry. Rales present.   Abdominal:      General: Abdomen is flat. There is no distension.      Palpations: Abdomen is soft.      Tenderness: There is abdominal tenderness. There is no guarding or rebound.      Comments: Soft, minimally distended (improving).  Incisions well approximated, C/D/I.  MAEVE drain with serous drainage.     Musculoskeletal:         General: Normal range of motion.      Cervical back: Normal range of motion.   Skin:     General: Skin is warm and dry.   Neurological:      General: No focal deficit present.      Mental Status: He is alert and oriented to person, place, and time.   Psychiatric:         Behavior: Behavior is cooperative.         Vital signs in last 24 hours:  Vitals:    09/25/24 0800   BP: 152/84   Pulse: 79   Resp: 18   Temp: 36.6 °C (97.9 °F)   SpO2: 98%         Intake/Output this shift:    Intake/Output Summary (Last 24 hours) at 9/25/2024 1254  Last data filed at 9/24/2024 1415  Gross per 24 hour   Intake 50 ml   Output 40 ml   Net 10 ml        Allergies:  Allergies   Allergen Reactions    Aspirin Hives        Medications:  Scheduled medications  acetaminophen, 650 mg, oral, q6h  atorvastatin, 40 mg, oral, Nightly  enoxaparin, 40 mg, subcutaneous, q24h  ketorolac, 15 mg, intravenous, q8h LACHO  metoprolol tartrate, 25 mg,  oral, BID  piperacillin-tazobactam, 3.375 g, intravenous, q8h  polyethylene glycol, 17 g, oral, BID  potassium chloride, 20 mEq, oral, Once  scopolamine, 1 patch, transdermal, q72h  sennosides-docusate sodium, 2 tablet, oral, BID      Continuous medications     PRN medications  PRN medications: HYDROmorphone, HYDROmorphone, metoclopramide, ondansetron, traZODone       Labs:  Results for orders placed or performed during the hospital encounter of 09/21/24 (from the past 24 hour(s))   Renal Function Panel   Result Value Ref Range    Glucose 132 (H) 74 - 99 mg/dL    Sodium 135 (L) 136 - 145 mmol/L    Potassium 3.3 (L) 3.5 - 5.3 mmol/L    Chloride 102 98 - 107 mmol/L    Bicarbonate 22 21 - 32 mmol/L    Anion Gap 14 10 - 20 mmol/L    Urea Nitrogen 19 6 - 23 mg/dL    Creatinine 0.95 0.50 - 1.30 mg/dL    eGFR 85 >60 mL/min/1.73m*2    Calcium 9.0 8.6 - 10.3 mg/dL    Phosphorus 2.9 2.5 - 4.9 mg/dL    Albumin 3.6 3.4 - 5.0 g/dL   Magnesium   Result Value Ref Range    Magnesium 1.79 1.60 - 2.40 mg/dL   CBC   Result Value Ref Range    WBC 11.1 4.4 - 11.3 x10*3/uL    nRBC 0.0 0.0 - 0.0 /100 WBCs    RBC 5.20 4.50 - 5.90 x10*6/uL    Hemoglobin 15.4 13.5 - 17.5 g/dL    Hematocrit 46.8 41.0 - 52.0 %    MCV 90 80 - 100 fL    MCH 29.6 26.0 - 34.0 pg    MCHC 32.9 32.0 - 36.0 g/dL    RDW 13.1 11.5 - 14.5 %    Platelets 216 150 - 450 x10*3/uL        Imaging:  XR abdomen 1 view    Result Date: 9/25/2024  Interpreted By:  Rolando Painting, STUDY: XR ABDOMEN 1 VIEW;  9/25/2024 10:16 am   INDICATION: Signs/Symptoms:Ileus..   COMPARISON: 09/23/2024   ACCESSION NUMBER(S): [id number]   ORDERING CLINICIAN: YUMIKO BURRIS   FINDINGS: There is increased gaseous distention of the stomach. Mild gaseous distention of the transverse colon unchanged. No dilated small bowel is visualized.       Increasing gaseous distention of the stomach.   MACRO: None   Signed by: Rolando Painting 9/25/2024 11:57 AM Dictation workstation:   [id number]    XR abdomen 1  view    Result Date: 9/23/2024  Interpreted By:  Renita Payton, STUDY: XR ABDOMEN 1 VIEW;  9/23/2024 8:26 pm   INDICATION: Signs/Symptoms:Post Op Ileus.     COMPARISON: None.   ACCESSION NUMBER(S): [id number]   ORDERING CLINICIAN: YUMIKO BURRIS   FINDINGS: 2 supine views of the abdomen show right upper quadrant surgical clips. Gas is noted in the stomach, right colon and transverse colon. Small bowel is unremarkable. A drainage catheter is suspected in the right abdomen.       1.  Mild gaseous distention of the colon and stomach possibly due to ileus. Recommend follow-up and clinical correlation   MACRO: None   Signed by: Renita Payton 9/23/2024 8:58 PM Dictation workstation:   [id number]      Plan  Biliary sludge   #Ileus    POD#3:  S/p Laparoscopic Appendectomy and Cholecystectomy Laparoscopy     #Possible ileus   - surgery as above  - avss  - Diet: advance to CLD from surgery standpoint.  Medicine team placed patient on FLD  - Pain control try to limit narcotics;.  Will add scheduled tylenol and IV toradol 15mg Q8 x24/hrs.   - Nausea: antiemetics PRN  - Encourage OOB and IS  - Lovenox on DVT prophylaxis - OK to resume from surgery standpoint   - Maintain MAEVE drain - will remove prior to discharge   - IVF   - ABX: on zosyn      - Daily labs    Plan of care discussed and patient seen with Dr. Sergio Moe, APRN-CNP    I spent 20 minutes in the professional and overall care of this patient.

## 2024-09-25 NOTE — PROGRESS NOTES
Brian Ayon is a 74 y.o. male on day 1 of admission presenting with Biliary sludge.    Subjective   Patient seen and examined, resting comfortably in chair this morning. Overnight, he did have worsening nausea and he felt like he was close to vomiting. He was treated with both Zofran and Reglan, and his nausea had improved by the morning during time of exam. The patient does report he is hungry, and feels that may be contributing to his nausea. Abdominal pain has improved. Continues to report pinkish output from his drain, and he feels that the output has not decreased. He has had bowel movements - he reports 5 bowel movements yesterday (first two were normal consistency, last three were loose but not liquid). Two more bowel movements this morning, more liquidy. Otherwise feeling well. Continues to ambulate the halls.      Objective     Physical Exam  Constitutional:       Appearance: Normal appearance. He is normal weight.   HENT:      Nose: Nose normal. No congestion or rhinorrhea.   Eyes:      Conjunctiva/sclera: Conjunctivae normal.      Pupils: Pupils are equal, round, and reactive to light.   Cardiovascular:      Rate and Rhythm: Normal rate and regular rhythm.      Pulses: Normal pulses.      Heart sounds: Normal heart sounds. No murmur heard.     No friction rub.   Pulmonary:      Effort: Pulmonary effort is normal.      Breath sounds: Normal breath sounds.   Abdominal:      General: Bowel sounds are normal. There is distension.      Tenderness: There is abdominal tenderness.      Comments: Mild distension with improved tenderness to palpation on today's exam.  MAEVE drain in place, draining serosanguineous fluid.   Musculoskeletal:         General: Normal range of motion.      Right lower leg: No edema.      Left lower leg: No edema.   Skin:     General: Skin is warm and dry.   Neurological:      General: No focal deficit present.      Mental Status: He is alert and oriented to person, place, and time.  "Mental status is at baseline.   Psychiatric:         Mood and Affect: Mood normal.         Behavior: Behavior normal.         Thought Content: Thought content normal.         Last Recorded Vitals  Blood pressure 148/90, pulse 97, temperature 36.6 °C (97.9 °F), temperature source Temporal, resp. rate 18, height 1.778 m (5' 10\"), weight 94 kg (207 lb 3.7 oz), SpO2 94%.  Intake/Output last 3 Shifts:  I/O last 3 completed shifts:  In: 3012.1 (32 mL/kg) [P.O.:660; I.V.:2302.1 (24.5 mL/kg); IV Piggyback:50]  Out: 400 (4.3 mL/kg) [Urine:250 (0.1 mL/kg/hr); Drains:150]  Weight: 94 kg     Relevant Results  Scheduled medications  atorvastatin, 40 mg, oral, Nightly  [Held by provider] enoxaparin, 40 mg, subcutaneous, q24h  piperacillin-tazobactam, 3.375 g, intravenous, q8h  polyethylene glycol, 17 g, oral, BID  sennosides-docusate sodium, 2 tablet, oral, BID      Continuous medications     PRN medications  PRN medications: HYDROmorphone, HYDROmorphone, ondansetron, traZODone         Results for orders placed or performed during the hospital encounter of 09/21/24 (from the past 24 hour(s))   Magnesium   Result Value Ref Range    Magnesium 1.82 1.60 - 2.40 mg/dL   Renal Function Panel   Result Value Ref Range    Glucose 163 (H) 74 - 99 mg/dL    Sodium 132 (L) 136 - 145 mmol/L    Potassium 3.7 3.5 - 5.3 mmol/L    Chloride 102 98 - 107 mmol/L    Bicarbonate 23 21 - 32 mmol/L    Anion Gap 11 10 - 20 mmol/L    Urea Nitrogen 12 6 - 23 mg/dL    Creatinine 0.84 0.50 - 1.30 mg/dL    eGFR >90 >60 mL/min/1.73m*2    Calcium 8.9 8.6 - 10.3 mg/dL    Phosphorus 2.0 (L) 2.5 - 4.9 mg/dL    Albumin 3.6 3.4 - 5.0 g/dL   CBC   Result Value Ref Range    WBC 13.4 (H) 4.4 - 11.3 x10*3/uL    nRBC 0.0 0.0 - 0.0 /100 WBCs    RBC 4.65 4.50 - 5.90 x10*6/uL    Hemoglobin 14.5 13.5 - 17.5 g/dL    Hematocrit 42.5 41.0 - 52.0 %    MCV 91 80 - 100 fL    MCH 31.2 26.0 - 34.0 pg    MCHC 34.1 32.0 - 36.0 g/dL    RDW 13.3 11.5 - 14.5 %    Platelets 189 150 - 450 " x10*3/uL      XR abdomen 1 view    Result Date: 9/23/2024  Interpreted By:  Renita Payton, STUDY: XR ABDOMEN 1 VIEW;  9/23/2024 8:26 pm   INDICATION: Signs/Symptoms:Post Op Ileus.     COMPARISON: None.   ACCESSION NUMBER(S): [id number]   ORDERING CLINICIAN: YUMIKO BURRIS   FINDINGS: 2 supine views of the abdomen show right upper quadrant surgical clips. Gas is noted in the stomach, right colon and transverse colon. Small bowel is unremarkable. A drainage catheter is suspected in the right abdomen.       1.  Mild gaseous distention of the colon and stomach possibly due to ileus. Recommend follow-up and clinical correlation   MACRO: None   Signed by: Renita Payton 9/23/2024 8:58 PM Dictation workstation:   [id number]    ECG 12 lead    Result Date: 9/23/2024  Normal sinus rhythm Left axis deviation Low voltage QRS Inferior infarct (cited on or before 10-FEB-2021) Abnormal ECG When compared with ECG of 02-MAR-2022 09:28, Premature atrial complexes are no longer Present Criteria for Anterolateral infarct are no longer Present    CT angio chest abdomen pelvis    Result Date: 9/22/2024  Interpreted By:  Renita Payton, STUDY: CT ANGIO CHEST ABDOMEN PELVIS;  9/22/2024 7:00 am   INDICATION: Signs/Symptoms:concern for dissection     COMPARISON: CT abdomen pelvis 09/22/2024 at 1:11 a.m.   ACCESSION NUMBER(S): [id number]   ORDERING CLINICIAN: BK VELEZ   TECHNIQUE: CT of the chest, abdomen, and pelvis was performed before and after intravenous contrast. Patient received 90 mL of Omnipaque 350 intravenously. Contiguous axial images were obtained at  5 mm slice thickness through the chest, and at  3 mm through the abdomen and pelvis. Coronal and sagittal reconstructions at  3 mm slice thickness were performed.   CT angiography was performed through the chest, abdomen, and pelvis. 3D reconstructions were performed as well as coronal and sagittal MIP images   FINDINGS: CHEST: Thyroid gland unremarkable. No aortic  aneurysm or aortic dissection. No mediastinal hematoma no CT signs of pulmonary embolism. Severe atherosclerotic coronary artery calcifications are seen. No adenopathy. Spurring noted in the thoracic spine. Dependent atelectasis is seen in the lung bases. Mild subpleural scarring in the left lower lobe posteriorly and laterally as well. No infiltrates or effusions. Left gynecomastia seen axial image 122. There is circumferential wall thickening of the esophagus which can be seen with esophagitis   CT angiogram of the abdominal aorta and branches: The abdominal aorta is normal in size with atherosclerotic calcification. No sign of aortic dissection or aortic aneurysm. The celiac artery, superior mesenteric artery, and inferior mesenteric artery are patent. Common iliac arteries as well as internal and external iliac arteries are patent with atherosclerotic calcification noted. Right common femoral artery is patent with atherosclerotic plaque. The left common femoral artery is occluded axial image 572. The distal left common femoral artery is reconstituted at the junction of the profunda femoris and superficial femoral artery.   Abdomen: Gallbladder is distended and contains sludge. No intrahepatic biliary distention. Common bile duct is nondilated. The liver, spleen,, and adrenal glands appear normal. The kidneys show no hydronephrosis and appear normal. No bowel obstruction, free fluid or free air. There are a few scattered diverticula throughout the colon.   Pelvis: The appendix is elongated with the proximal portion containing air and an elongated fluid-filled distal portion having a short axis measurement of 10 mm. There is no periappendiceal abscess. No periappendiceal inflammatory changes at this time. There is extensive sigmoid diverticulosis but no definite sign of diverticulitis. A moderate right inguinal hernia fat with a knuckle of sigmoid colon extending toward the hernia. There is a smaller fat  containing left inguinal hernia. Prostate gland is enlarged and contains a few calcifications. Urinary bladder shows mild wall thickening. Degenerative changes noted throughout the spine.         1. The appendix is elongated with the distal half of the appendix being mildly distended and fluid-filled measuring 10 mm short axis. Clinical correlation is needed if there is concern for acute appendicitis. No periappendiceal abscess noted. Consider surgical consultation. 2. Severe atherosclerotic coronary artery calcifications 3. Distended gallbladder containing sludge 4. 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 No aortic aneurysm or aortic dissection. 5. Colonic diverticulosis most severe in the sigmoid colon but no sign of diverticulitis 6. Bilateral fat containing inguinal hernias right worse than left 7. Diffuse wall thickening of the esophagus possibly due to esophagitis.   MACRO: Critical Finding:  See findings. Notification was initiated on 9/22/2024 at 7:36 am by  Renita Payton.  (**-OCF-**) Instructions:   Signed by: Renita Payton 9/22/2024 7:37 AM Dictation workstation:   [id number]    CT abdomen pelvis w IV contrast    Result Date: 9/22/2024  Interpreted By:  David Lance, STUDY: CT ABDOMEN PELVIS W IV CONTRAST;  9/22/2024 1:15 am   INDICATION: Signs/Symptoms:Epigastric and central abdominal pain x 4 hours. Nausea and vomiting.  No prior abdominal surgeries..   COMPARISON: None.   ACCESSION NUMBER(S): [id number]   ORDERING CLINICIAN: SANDIP GALLEGO   TECHNIQUE: Contiguous axial images of the abdomen and pelvis were obtained after the intravenous administration of iodinated contrast. Coronal and sagittal reformatted images were reconstructed from the axial data.   FINDINGS: There is sludge dependent portion of the gallbladder. No wall thickening or pericholecystic fluid.   The liver, adrenals, pancreas and spleen are  unremarkable.   Kidneys intact without hydronephrosis or hydroureter. Bladder within normal limits.   No bowel obstruction. No appendicitis. There is diverticulosis without diverticulitis.   No free fluid or significant adenopathy.   There is a fat containing right inguinal hernia which also partially contains a loop of small bowel. No incarceration/obstruction.   Calcification of the prostate which is mildly enlarged.   Incidental note made of segmental occlusion of the left common femoral artery which is suspected.   Moderate calcification aorta without aneurysm. Severe coronary artery calcification is incidentally seen in the chest.   Mild to moderate spondylotic degeneration seen axial skeleton.       Sludge seen dependent portion of the gallbladder without definite evidence of acute cholecystitis. If there is clinical concern consider HIDA scan.   Additional findings as above.     MACRO: None.   Signed by: David Lance 9/22/2024 1:53 AM Dictation workstation:   [id number]         Assessment/Plan    Brian Ayon is a 74 year old male with a PMHx of HTN, HLD, CAD s/p PCI w/ stent to LAD 2021 after STEMI , another stent in 2016 but unclear where who presented for severe sudden onset periumbilical abd pain, nausea and one episode of nonbilous nonbloody vomiting while in the ED. CT imaging was remarkable for concern of biliary sludge in the gallbladder and chronic appendicitis. General surgery (Dr. Prashant Alicia) performed both a laparoscopic appendectomy and cholecystecomy of a gangrenous gallbladder. We are managing him s/p surgery and will defer to surgery team for recs prior to discharge including MAEVE removal and DVT prophylaxis.     #Biliary sludge with gangrenous gallbladder  #Chronic appendicitis with localized peritonitis, without perforation, abscess, or gangrene.  -CT imaging with c/f above.   -s/p laparoscopic appendectomy and cholecystecomy of a gangrenous gallbladder.  -WBC downtrending 15.6->13.4  -> 11.1  -Hepatic function panel with elevated bilirubin (2.0) and direct bilirubin (0.6) initially  -Lipase and lactate normal  -Cultures taken during surgery showed no bacterial growth  Plan:  -Pain management: S/p Toradol x1 on 9/24. Scheduled Tylenol x6. Dilaudid available for severe and breakthrough pain, has not required this since 9/23. Try to limit narcotic medication.   -Watch drain output, currently serosanguineous drainage  -Trend labs and vitals  -On telemetry  -Replete electrolytes as needed, K+>4, Mg+>2  -General surgery on consult              Per gen surg, anticipate drain removal prior to discharge  Per gen surg, safe to restart DVT prophylaxis. Subcutaneous lovenox initiated.   Patiently currently NPO, but has had bowel movements. Plan to advance diet.   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   -Hx of cardiovascular risk factors including smoking history, HTN, HLD, CAD s/p PCI w/ stent to LAD 2021 after STEMI  -Troponins normal  -A1c 7.1%  -Lipid panel result of elevated cholesterol (210) and LDL (152)  -ASCVD score 29% for 10 year cardiovascular event risk  -Follow up outpatient for proper management and prevention of ACS     CHRONIC PROBLEMS:  # CAD s/p PCI  # Hypertension  # Hyperlipidemia  -continue home meds as appropriate      Patient's plan of care discussed with senior resident and attending physician.     Gloria Okeefe,   Internal Medicine PGY-1 Resident

## 2024-09-25 NOTE — PROGRESS NOTES
09/25/24 6019   Discharge Planning   Expected Discharge Disposition Home     Nursing confirmed that MAEVE drain teaching has been completed with patient and spouse. Patient to return home when medically ready for d/c.

## 2024-09-25 NOTE — NURSING NOTE
Pt had total of 4 BM this shift. Also requesting zofran for nausea this a.m. , effective. Ambulating in avalos with wife this shift. Call light in reach, safety maintained.

## 2024-09-25 NOTE — CARE PLAN
The patient's goals for the shift include      The clinical goals for the shift include Pt will have BM by end of this shift      Problem: Pain - Adult  Goal: Verbalizes/displays adequate comfort level or baseline comfort level  Outcome: Progressing     Problem: Safety - Adult  Goal: Free from fall injury  Outcome: Progressing     Problem: Discharge Planning  Goal: Discharge to home or other facility with appropriate resources  Outcome: Progressing     Problem: Chronic Conditions and Co-morbidities  Goal: Patient's chronic conditions and co-morbidity symptoms are monitored and maintained or improved  Outcome: Progressing     Problem: Fall/Injury  Goal: Not fall by end of shift  Outcome: Progressing  Goal: Be free from injury by end of the shift  Outcome: Progressing  Goal: Verbalize understanding of personal risk factors for fall in the hospital  Outcome: Progressing  Goal: Verbalize understanding of risk factor reduction measures to prevent injury from fall in the home  Outcome: Progressing  Goal: Use assistive devices by end of the shift  Outcome: Progressing  Goal: Pace activities to prevent fatigue by end of the shift  Outcome: Progressing     Problem: Pain  Goal: Takes deep breaths with improved pain control throughout the shift  Outcome: Progressing  Goal: Turns in bed with improved pain control throughout the shift  Outcome: Progressing  Goal: Walks with improved pain control throughout the shift  Outcome: Progressing  Goal: Performs ADL's with improved pain control throughout shift  Outcome: Progressing  Goal: Participates in PT with improved pain control throughout the shift  Outcome: Progressing  Goal: Free from opioid side effects throughout the shift  Outcome: Progressing  Goal: Free from acute confusion related to pain meds throughout the shift  Outcome: Progressing

## 2024-09-26 VITALS
OXYGEN SATURATION: 97 % | BODY MASS INDEX: 29.67 KG/M2 | WEIGHT: 207.23 LBS | SYSTOLIC BLOOD PRESSURE: 138 MMHG | HEIGHT: 70 IN | RESPIRATION RATE: 16 BRPM | HEART RATE: 73 BPM | DIASTOLIC BLOOD PRESSURE: 92 MMHG | TEMPERATURE: 97.5 F

## 2024-09-26 LAB
ALBUMIN SERPL BCP-MCNC: 3.8 G/DL (ref 3.4–5)
ANION GAP SERPL CALC-SCNC: 12 MMOL/L (ref 10–20)
BUN SERPL-MCNC: 15 MG/DL (ref 6–23)
CALCIUM SERPL-MCNC: 9.2 MG/DL (ref 8.6–10.3)
CHLORIDE SERPL-SCNC: 102 MMOL/L (ref 98–107)
CO2 SERPL-SCNC: 26 MMOL/L (ref 21–32)
CREAT SERPL-MCNC: 0.85 MG/DL (ref 0.5–1.3)
EGFRCR SERPLBLD CKD-EPI 2021: >90 ML/MIN/1.73M*2
ERYTHROCYTE [DISTWIDTH] IN BLOOD BY AUTOMATED COUNT: 13.2 % (ref 11.5–14.5)
GLUCOSE SERPL-MCNC: 136 MG/DL (ref 74–99)
HCT VFR BLD AUTO: 47.3 % (ref 41–52)
HGB BLD-MCNC: 15.6 G/DL (ref 13.5–17.5)
MAGNESIUM SERPL-MCNC: 1.87 MG/DL (ref 1.6–2.4)
MCH RBC QN AUTO: 30.2 PG (ref 26–34)
MCHC RBC AUTO-ENTMCNC: 33 G/DL (ref 32–36)
MCV RBC AUTO: 92 FL (ref 80–100)
NRBC BLD-RTO: 0 /100 WBCS (ref 0–0)
PHOSPHATE SERPL-MCNC: 2.7 MG/DL (ref 2.5–4.9)
PLATELET # BLD AUTO: 258 X10*3/UL (ref 150–450)
POTASSIUM SERPL-SCNC: 3.5 MMOL/L (ref 3.5–5.3)
RBC # BLD AUTO: 5.16 X10*6/UL (ref 4.5–5.9)
SODIUM SERPL-SCNC: 136 MMOL/L (ref 136–145)
WBC # BLD AUTO: 10 X10*3/UL (ref 4.4–11.3)

## 2024-09-26 PROCEDURE — 36415 COLL VENOUS BLD VENIPUNCTURE: CPT

## 2024-09-26 PROCEDURE — 2500000001 HC RX 250 WO HCPCS SELF ADMINISTERED DRUGS (ALT 637 FOR MEDICARE OP)

## 2024-09-26 PROCEDURE — 80069 RENAL FUNCTION PANEL: CPT

## 2024-09-26 PROCEDURE — 99239 HOSP IP/OBS DSCHRG MGMT >30: CPT

## 2024-09-26 PROCEDURE — 99231 SBSQ HOSP IP/OBS SF/LOW 25: CPT | Performed by: NURSE PRACTITIONER

## 2024-09-26 PROCEDURE — 85027 COMPLETE CBC AUTOMATED: CPT

## 2024-09-26 PROCEDURE — 83735 ASSAY OF MAGNESIUM: CPT

## 2024-09-26 PROCEDURE — 2500000004 HC RX 250 GENERAL PHARMACY W/ HCPCS (ALT 636 FOR OP/ED): Performed by: SURGERY

## 2024-09-26 PROCEDURE — 2500000004 HC RX 250 GENERAL PHARMACY W/ HCPCS (ALT 636 FOR OP/ED): Performed by: STUDENT IN AN ORGANIZED HEALTH CARE EDUCATION/TRAINING PROGRAM

## 2024-09-26 RX ORDER — AMOXICILLIN AND CLAVULANATE POTASSIUM 875; 125 MG/1; MG/1
1 TABLET, FILM COATED ORAL EVERY 12 HOURS SCHEDULED
Qty: 9 TABLET | Refills: 0 | Status: SHIPPED | OUTPATIENT
Start: 2024-09-26 | End: 2024-10-01

## 2024-09-26 RX ORDER — AMOXICILLIN AND CLAVULANATE POTASSIUM 875; 125 MG/1; MG/1
1 TABLET, FILM COATED ORAL EVERY 12 HOURS SCHEDULED
Status: DISCONTINUED | OUTPATIENT
Start: 2024-09-26 | End: 2024-09-26 | Stop reason: HOSPADM

## 2024-09-26 RX ORDER — AMOXICILLIN AND CLAVULANATE POTASSIUM 875; 125 MG/1; MG/1
1 TABLET, FILM COATED ORAL EVERY 12 HOURS SCHEDULED
Status: DISCONTINUED | OUTPATIENT
Start: 2024-09-26 | End: 2024-09-26

## 2024-09-26 RX ORDER — ATORVASTATIN CALCIUM 40 MG/1
40 TABLET, FILM COATED ORAL NIGHTLY
Qty: 30 TABLET | Refills: 0 | Status: SHIPPED | OUTPATIENT
Start: 2024-09-26 | End: 2024-10-26

## 2024-09-26 RX ORDER — METOPROLOL TARTRATE 25 MG/1
25 TABLET, FILM COATED ORAL 2 TIMES DAILY
Qty: 60 TABLET | Refills: 0 | Status: SHIPPED | OUTPATIENT
Start: 2024-09-26 | End: 2024-10-26

## 2024-09-26 RX ORDER — ONDANSETRON 4 MG/1
4 TABLET, ORALLY DISINTEGRATING ORAL EVERY 8 HOURS PRN
Qty: 20 TABLET | Refills: 0 | Status: SHIPPED | OUTPATIENT
Start: 2024-09-26

## 2024-09-26 RX ORDER — METOPROLOL TARTRATE 25 MG/1
25 TABLET, FILM COATED ORAL 2 TIMES DAILY
Qty: 60 TABLET | Refills: 0 | Status: CANCELLED | OUTPATIENT
Start: 2024-09-26 | End: 2024-10-26

## 2024-09-26 RX ADMIN — METOPROLOL TARTRATE 25 MG: 25 TABLET, FILM COATED ORAL at 08:08

## 2024-09-26 RX ADMIN — ENOXAPARIN SODIUM 40 MG: 40 INJECTION SUBCUTANEOUS at 13:10

## 2024-09-26 RX ADMIN — AMOXICILLIN AND CLAVULANATE POTASSIUM 1 TABLET: 875; 125 TABLET, FILM COATED ORAL at 13:09

## 2024-09-26 RX ADMIN — PIPERACILLIN SODIUM AND TAZOBACTAM SODIUM 3.38 G: 3; .375 INJECTION, SOLUTION INTRAVENOUS at 07:22

## 2024-09-26 RX ADMIN — ACETAMINOPHEN 650 MG: 325 TABLET ORAL at 07:21

## 2024-09-26 ASSESSMENT — COGNITIVE AND FUNCTIONAL STATUS - GENERAL
DAILY ACTIVITIY SCORE: 24
MOBILITY SCORE: 24

## 2024-09-26 ASSESSMENT — PAIN SCALES - GENERAL: PAINLEVEL_OUTOF10: 0 - NO PAIN

## 2024-09-26 NOTE — NURSING NOTE
Gary drain removed, drain pressure released, suture that was holding drain in cut  and then slowly drain removed without complication,  no resistance, pt had no complaints. Dry dressing applied

## 2024-09-26 NOTE — NURSING NOTE
Pt has been up in chair, up in halls ambulating, voices no complaints. IV zosyn changed to oral atb, first dose  given. Pt has been encouraged to order food, as diet was advanced from full liquids to 40gm fat restricted at lunch. Pt did order food around 2pm. Did give pt applesauce with atb and at that time encouraged him to order. MAEVE drain intact. Call light in reach, safety maintained    Pt tolerated lunch, ambulated in halls, voices no complaitns, Dr Okeefe rounded and pt to be  discharged, MAEVE to be removed.

## 2024-09-26 NOTE — PROGRESS NOTES
09/26/24 1522   Discharge Planning   Assistance Needed MAEVE to be removed prior to discharge . No home going needs.

## 2024-09-26 NOTE — DISCHARGE SUMMARY
Discharge Diagnosis  Biliary sludge    Issues Requiring Follow-Up  Please follow-up with surgery status post your laparoscopic cholecystectomy and appendectomy  CT imaging revealed segmental occlusion of the left common femoral artery and severe atherosclerotic coronary artery calcifications -patient started on statin medication, advised to follow-up with primary care and cardiologist    Discharge Meds     Medication List      START taking these medications     amoxicillin-pot clavulanate 875-125 mg tablet; Commonly known as:   Augmentin; Take 1 tablet by mouth every 12 hours for 9 doses.   atorvastatin 40 mg tablet; Commonly known as: Lipitor; Take 1 tablet (40   mg) by mouth once daily at bedtime.   metoprolol tartrate 25 mg tablet; Commonly known as: Lopressor; Take 1   tablet (25 mg) by mouth 2 times a day.   ondansetron ODT 4 mg disintegrating tablet; Commonly known as:   Zofran-ODT; Take 1 tablet (4 mg) by mouth every 8 hours if needed for   nausea or vomiting.       Test Results Pending At Discharge  Pending Labs       Order Current Status    Surgical Pathology Exam In process            Hospital Course  Mr. Ayon is a 74-year-old male who initially presented to [facility] ED on 9/21 complaining of acute periumbilical abdominal pain with radiation to the right quadrant.  On presentation to the ED emergency room, patient was afebrile but hypertensive at 158/89.  No leukocytosis, hemoglobin 16.7.  CMP was largely unremarkable.  AST 16, ALT 18, ALP 78.  Lipase 37.  CT abdomen pelvis showed sludge in the dependent portion of the gallbladder without definitive evidence of acute cholecystitis.  Patient was admitted for management of his abdominal pain and further workup.  His white blood cell count did uptrend, to a max of 15.6.  He was started on antibiotics and general surgery planned for surgical intervention.  On 9/22, patient underwent laparoscopic cholecystectomy and appendectomy with placement of a MAEVE drain.   His white blood cell count did downtrend, will discharge at 10.  He was afebrile during his stay.  Continued on IV antibiotics.  Surgical course was complicated by postoperative ileus, which resolved with conservative treatment.  He was deemed stable for discharge on 9/26 as he was saturating well on room air and was able to tolerate a low-fat diet.  He was having consistent bowel movements, no nausea, improvement in his abdominal pain.  The MAEVE drain will be pulled prior to discharge.  He will be discharged with 4 additional days of Augmentin, to be completed on 9/30.  He was also started on atorvastatin as well as metoprolol due to his past coronary history.  He was instructed to follow-up with his cardiologist, Dr. Melara, within 4 to 6 weeks of hospital discharge to further discuss these medications.  He will also be referred to a primary care physician as he does not have one currently.  He has to follow-up with Dr. Alicia, his surgeon, in 2 weeks.    Pertinent Physical Exam At Time of Discharge  Physical Exam  General: Not in acute distress, A&O x3, alert, coopertive, well-developed  HEENT: Normocephalic, atraumatic, EOMI, moist mucous membranes  Neck: Neck supple, trachea midline, no evidence of trauma  Cardiovascular: RRR, S1 and S2 appreciated, no murmurs rubs gallops appreciated, distal pulses 2+ bilaterally (radial and dorsalis pedis)  Respiratory: Vesicular breath sounds appreciated bilaterally, no adventitious sounds appreciated, no increased work of breathing  GI: Abdomen soft, distention, mild tenderness to palpation over the incision sites, nontender to palpation in the right upper quadrant, right lower quadrant.  In place over incisions, not removed today on exam.  No overt drainage noted on the dressings.  MAEVE drain in place at time of exam, draining serosanguineous fluid.  Extremities: No edema appreciated in lower extremities bilaterally, no cyanosis  Neuro: A&O x3, no focal deficits, strength  and sensation intact bilaterally  Skin: Warm and dry, without lesions or rashes      Outpatient Follow-Up  No future appointments.      Gloria Okeefe DO  Internal Medicine PGY-1 Resident

## 2024-09-26 NOTE — DISCHARGE INSTRUCTIONS
You are hospitalized due to your abdominal pain.  You underwent surgery to remove your gallbladder and appendix.     Please follow up with your primary care provider within 7 days for hospital follow-up. Please call to make this appointment.  Follow-up with your surgeon, Dr. Alicia, in 2 weeks.  Call to make this appointment.    Follow-up with cardiology on an outpatient basis.  You see Dr. Melara - please call to make an appointment. Try to be seen within 4-6 weeks.     Please take your medications as prescribed.  You have been prescribed an antibiotic, Augmentin.  You should take this medication twice a day. Your last day taking this medication will be 9/30.  You have also been started on a cholesterol medication. You will be prescribed atorvastatin 40 mg to be taken once a day.  Follow-up with your PCP for medication refills.  Also been started on a medication for your blood pressure.  You are prescribed metoprolol 25 mg to be taken twice a day.  Follow-up with your PCP for medication refills.  Please discuss this new cholesterol medicine and blood pressure medicine with your cardiologist.    If you have any concerning symptoms, or worsening symptoms please call or return, such as chest pain, shortness of breath, new onset confusion, loss of sensation, or fever >103F.    Thank you for allowing us to participate in your medical care!    -[facility] [place] inpatient medicine teaching service

## 2024-09-26 NOTE — NURSING NOTE
Pt discharged home, wife here HL remvoed, cath intact, reviewed discharge instructions with pt, gave verbal understanding.

## 2024-09-26 NOTE — PROGRESS NOTES
Brian Ayon is a 74 y.o. male on day 4 of admission presenting with Biliary sludge.    Subjective   Patient seen and examined, resting comfortably in chair this morning.  Overall he is feeling better.  He reports that his nausea has improved since starting on a full liquid diet.  He has not had a bowel movement since advancing his diet, however he is continuing to pass gas.  He also reports significant belching.  Abdominal tenderness is improved.  Continues to drain serosanguineous fluid from his MAEVE drain.      Objective     Physical Exam  Constitutional:       Appearance: Normal appearance. He is normal weight.   HENT:      Nose: Nose normal. No congestion or rhinorrhea.   Eyes:      Conjunctiva/sclera: Conjunctivae normal.      Pupils: Pupils are equal, round, and reactive to light.   Cardiovascular:      Rate and Rhythm: Normal rate and regular rhythm.      Pulses: Normal pulses.      Heart sounds: Normal heart sounds. No murmur heard.     No friction rub.   Pulmonary:      Effort: Pulmonary effort is normal.      Breath sounds: Normal breath sounds.   Abdominal:      General: Bowel sounds are normal. There is distension.      Tenderness: There is abdominal tenderness.      Comments: Mild distension with minimal tenderness to palpation on today's exam.  Dressing in place for incisions, not removed today on exam.  No overt drainage noted on dressings.  MAEVE drain in place, draining serosanguineous fluid.   Musculoskeletal:         General: Normal range of motion.      Right lower leg: No edema.      Left lower leg: No edema.   Skin:     General: Skin is warm and dry.   Neurological:      General: No focal deficit present.      Mental Status: He is alert and oriented to person, place, and time. Mental status is at baseline.   Psychiatric:         Mood and Affect: Mood normal.         Behavior: Behavior normal.         Thought Content: Thought content normal.         Last Recorded Vitals  Blood pressure 148/90,  "pulse 97, temperature 36.6 °C (97.9 °F), temperature source Temporal, resp. rate 18, height 1.778 m (5' 10\"), weight 94 kg (207 lb 3.7 oz), SpO2 94%.  Intake/Output last 3 Shifts:  I/O last 3 completed shifts:  In: 3012.1 (32 mL/kg) [P.O.:660; I.V.:2302.1 (24.5 mL/kg); IV Piggyback:50]  Out: 400 (4.3 mL/kg) [Urine:250 (0.1 mL/kg/hr); Drains:150]  Weight: 94 kg     Relevant Results  Scheduled medications  atorvastatin, 40 mg, oral, Nightly  [Held by provider] enoxaparin, 40 mg, subcutaneous, q24h  piperacillin-tazobactam, 3.375 g, intravenous, q8h  polyethylene glycol, 17 g, oral, BID  sennosides-docusate sodium, 2 tablet, oral, BID      Continuous medications     PRN medications  PRN medications: HYDROmorphone, HYDROmorphone, ondansetron, traZODone         Results for orders placed or performed during the hospital encounter of 09/21/24 (from the past 24 hour(s))   Magnesium   Result Value Ref Range    Magnesium 1.82 1.60 - 2.40 mg/dL   Renal Function Panel   Result Value Ref Range    Glucose 163 (H) 74 - 99 mg/dL    Sodium 132 (L) 136 - 145 mmol/L    Potassium 3.7 3.5 - 5.3 mmol/L    Chloride 102 98 - 107 mmol/L    Bicarbonate 23 21 - 32 mmol/L    Anion Gap 11 10 - 20 mmol/L    Urea Nitrogen 12 6 - 23 mg/dL    Creatinine 0.84 0.50 - 1.30 mg/dL    eGFR >90 >60 mL/min/1.73m*2    Calcium 8.9 8.6 - 10.3 mg/dL    Phosphorus 2.0 (L) 2.5 - 4.9 mg/dL    Albumin 3.6 3.4 - 5.0 g/dL   CBC   Result Value Ref Range    WBC 13.4 (H) 4.4 - 11.3 x10*3/uL    nRBC 0.0 0.0 - 0.0 /100 WBCs    RBC 4.65 4.50 - 5.90 x10*6/uL    Hemoglobin 14.5 13.5 - 17.5 g/dL    Hematocrit 42.5 41.0 - 52.0 %    MCV 91 80 - 100 fL    MCH 31.2 26.0 - 34.0 pg    MCHC 34.1 32.0 - 36.0 g/dL    RDW 13.3 11.5 - 14.5 %    Platelets 189 150 - 450 x10*3/uL      XR abdomen 1 view    Result Date: 9/23/2024  Interpreted By:  Renita Payton, STUDY: XR ABDOMEN 1 VIEW;  9/23/2024 8:26 pm   INDICATION: Signs/Symptoms:Post Op Ileus.     COMPARISON: None.   ACCESSION " NUMBER(S): [id number]   ORDERING CLINICIAN: YUMIKO BURRIS   FINDINGS: 2 supine views of the abdomen show right upper quadrant surgical clips. Gas is noted in the stomach, right colon and transverse colon. Small bowel is unremarkable. A drainage catheter is suspected in the right abdomen.       1.  Mild gaseous distention of the colon and stomach possibly due to ileus. Recommend follow-up and clinical correlation   MACRO: None   Signed by: Renita Payton 9/23/2024 8:58 PM Dictation workstation:   [id number]    ECG 12 lead    Result Date: 9/23/2024  Normal sinus rhythm Left axis deviation Low voltage QRS Inferior infarct (cited on or before 10-FEB-2021) Abnormal ECG When compared with ECG of 02-MAR-2022 09:28, Premature atrial complexes are no longer Present Criteria for Anterolateral infarct are no longer Present    CT angio chest abdomen pelvis    Result Date: 9/22/2024  Interpreted By:  Renita Payton, STUDY: CT ANGIO CHEST ABDOMEN PELVIS;  9/22/2024 7:00 am   INDICATION: Signs/Symptoms:concern for dissection     COMPARISON: CT abdomen pelvis 09/22/2024 at 1:11 a.m.   ACCESSION NUMBER(S): [id number]   ORDERING CLINICIAN: BK VELEZ   TECHNIQUE: CT of the chest, abdomen, and pelvis was performed before and after intravenous contrast. Patient received 90 mL of Omnipaque 350 intravenously. Contiguous axial images were obtained at  5 mm slice thickness through the chest, and at  3 mm through the abdomen and pelvis. Coronal and sagittal reconstructions at  3 mm slice thickness were performed.   CT angiography was performed through the chest, abdomen, and pelvis. 3D reconstructions were performed as well as coronal and sagittal MIP images   FINDINGS: CHEST: Thyroid gland unremarkable. No aortic aneurysm or aortic dissection. No mediastinal hematoma no CT signs of pulmonary embolism. Severe atherosclerotic coronary artery calcifications are seen. No adenopathy. Spurring noted in the thoracic spine. Dependent  atelectasis is seen in the lung bases. Mild subpleural scarring in the left lower lobe posteriorly and laterally as well. No infiltrates or effusions. Left gynecomastia seen axial image 122. There is circumferential wall thickening of the esophagus which can be seen with esophagitis   CT angiogram of the abdominal aorta and branches: The abdominal aorta is normal in size with atherosclerotic calcification. No sign of aortic dissection or aortic aneurysm. The celiac artery, superior mesenteric artery, and inferior mesenteric artery are patent. Common iliac arteries as well as internal and external iliac arteries are patent with atherosclerotic calcification noted. Right common femoral artery is patent with atherosclerotic plaque. The left common femoral artery is occluded axial image 572. The distal left common femoral artery is reconstituted at the junction of the profunda femoris and superficial femoral artery.   Abdomen: Gallbladder is distended and contains sludge. No intrahepatic biliary distention. Common bile duct is nondilated. The liver, spleen,, and adrenal glands appear normal. The kidneys show no hydronephrosis and appear normal. No bowel obstruction, free fluid or free air. There are a few scattered diverticula throughout the colon.   Pelvis: The appendix is elongated with the proximal portion containing air and an elongated fluid-filled distal portion having a short axis measurement of 10 mm. There is no periappendiceal abscess. No periappendiceal inflammatory changes at this time. There is extensive sigmoid diverticulosis but no definite sign of diverticulitis. A moderate right inguinal hernia fat with a knuckle of sigmoid colon extending toward the hernia. There is a smaller fat containing left inguinal hernia. Prostate gland is enlarged and contains a few calcifications. Urinary bladder shows mild wall thickening. Degenerative changes noted throughout the spine.         1. The appendix is elongated  with the distal half of the appendix being mildly distended and fluid-filled measuring 10 mm short axis. Clinical correlation is needed if there is concern for acute appendicitis. No periappendiceal abscess noted. Consider surgical consultation. 2. Severe atherosclerotic coronary artery calcifications 3. Distended gallbladder containing sludge 4. 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 No aortic aneurysm or aortic dissection. 5. Colonic diverticulosis most severe in the sigmoid colon but no sign of diverticulitis 6. Bilateral fat containing inguinal hernias right worse than left 7. Diffuse wall thickening of the esophagus possibly due to esophagitis.   MACRO: Critical Finding:  See findings. Notification was initiated on 9/22/2024 at 7:36 am by  Renita Payton.  (**-OCF-**) Instructions:   Signed by: Renita Payton 9/22/2024 7:37 AM Dictation workstation:   [id number]    CT abdomen pelvis w IV contrast    Result Date: 9/22/2024  Interpreted By:  David Lance, STUDY: CT ABDOMEN PELVIS W IV CONTRAST;  9/22/2024 1:15 am   INDICATION: Signs/Symptoms:Epigastric and central abdominal pain x 4 hours. Nausea and vomiting.  No prior abdominal surgeries..   COMPARISON: None.   ACCESSION NUMBER(S): [id number]   ORDERING CLINICIAN: SANDIP GALLEGO   TECHNIQUE: Contiguous axial images of the abdomen and pelvis were obtained after the intravenous administration of iodinated contrast. Coronal and sagittal reformatted images were reconstructed from the axial data.   FINDINGS: There is sludge dependent portion of the gallbladder. No wall thickening or pericholecystic fluid.   The liver, adrenals, pancreas and spleen are unremarkable.   Kidneys intact without hydronephrosis or hydroureter. Bladder within normal limits.   No bowel obstruction. No appendicitis. There is diverticulosis without diverticulitis.   No free fluid or significant adenopathy.    There is a fat containing right inguinal hernia which also partially contains a loop of small bowel. No incarceration/obstruction.   Calcification of the prostate which is mildly enlarged.   Incidental note made of segmental occlusion of the left common femoral artery which is suspected.   Moderate calcification aorta without aneurysm. Severe coronary artery calcification is incidentally seen in the chest.   Mild to moderate spondylotic degeneration seen axial skeleton.       Sludge seen dependent portion of the gallbladder without definite evidence of acute cholecystitis. If there is clinical concern consider HIDA scan.   Additional findings as above.     MACRO: None.   Signed by: David Lance 9/22/2024 1:53 AM Dictation workstation:   [id number]         Assessment/Plan    Brian Ayon is a 74 year old male with a PMHx of HTN, HLD, CAD s/p PCI w/ stent to LAD 2021 after STEMI , another stent in 2016 but unclear where who presented for severe sudden onset periumbilical abd pain, nausea and one episode of nonbilous nonbloody vomiting while in the ED. CT imaging was remarkable for concern of biliary sludge in the gallbladder and chronic appendicitis. General surgery (Dr. Prashant Alicia) performed both a laparoscopic appendectomy and cholecystecomy of a gangrenous gallbladder. We are managing him s/p surgery and will defer to surgery team for recs prior to discharge including MAEVE removal and DVT prophylaxis.     #Biliary sludge with gangrenous gallbladder  #Chronic appendicitis with localized peritonitis, without perforation, abscess, or gangrene.  -CT imaging with c/f above.   -s/p laparoscopic appendectomy and cholecystecomy of a gangrenous gallbladder.  -WBC downtrending 15.6->13.4 -> 11.1  -Hepatic function panel with elevated bilirubin (2.0) and direct bilirubin (0.6) initially  -Lipase and lactate normal  -Cultures taken during surgery showed no bacterial growth  Plan:  -Pain management: S/p Toradol x1 on  9/24. Scheduled Tylenol x6. Dilaudid available for severe and breakthrough pain, has not required this since 9/23. Try to limit narcotic medication.   -Watch drain output, currently serosanguineous drainage  -Trend labs and vitals  -On telemetry  -Replete electrolytes as needed, K+>4, Mg+>2  -General surgery on consult              Per gen surg, anticipate drain removal prior to discharge  Per gen surg, safe to restart DVT prophylaxis. Subcutaneous lovenox initiated.   Patiently advanced to full liquid diet yesterday, seems to be tolerating well.  Refer to surgery for further advancement of diet.  -On bowel regimen: Senna and Mirilax  -Currently on Zosyn day 5.  Discontinue Zosyn and switch to Augmentin twice daily.  Plan on 14-day course of antibiotics.       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   -Hx of cardiovascular risk factors including smoking history, HTN, HLD, CAD s/p PCI w/ stent to LAD 2021 after STEMI  -Troponins normal  -A1c 7.1%  -Lipid panel result of elevated cholesterol (210) and LDL (152)  -ASCVD score 29% for 10 year cardiovascular event risk  -Follow up outpatient for proper management and prevention of ACS       CHRONIC PROBLEMS:  # CAD s/p PCI  # Hypertension  # Hyperlipidemia  -continue home meds as appropriate      Patient's plan of care discussed with senior resident and attending physician.     Gloria Okeefe,   Internal Medicine PGY-1 Resident

## 2024-09-26 NOTE — PROGRESS NOTES
Biran CHANI Gabo 74 y.o. male    Subjective  Patient seen and examined this morning.  Denies nausea and vomiting. No fever or chills. Tolerating full liquid diet. Pain controlled.  Reports passing flatus and reports a regular bowel movement this morning. Urinating well. Up ambulating. No acute events overnight.    Objective  PHYSICAL EXAM:  Physical Exam  Vitals reviewed.   Constitutional:       General: He is awake.      Appearance: Normal appearance.   Cardiovascular:      Rate and Rhythm: Normal rate and regular rhythm.      Pulses: Normal pulses.      Heart sounds: Normal heart sounds.   Pulmonary:      Effort: Pulmonary effort is normal.      Breath sounds: Normal air entry. Rales present.   Abdominal:      General: Abdomen is flat. There is no distension.      Palpations: Abdomen is soft.      Tenderness: There is abdominal tenderness. There is no guarding or rebound.      Comments: Soft, minimally distended (improving).  Incisions well approximated, C/D/I.  MAEVE drain with serous drainage.     Musculoskeletal:         General: Normal range of motion.      Cervical back: Normal range of motion.   Skin:     General: Skin is warm and dry.   Neurological:      General: No focal deficit present.      Mental Status: He is alert and oriented to person, place, and time.   Psychiatric:         Behavior: Behavior is cooperative.         Vital signs in last 24 hours:  Vitals:    09/26/24 0800   BP: 140/83   Pulse: 67   Resp: 16   Temp: 36.4 °C (97.5 °F)   SpO2: 96%         Intake/Output this shift:    Intake/Output Summary (Last 24 hours) at 9/26/2024 1132  Last data filed at 9/26/2024 1025  Gross per 24 hour   Intake 50 ml   Output 65 ml   Net -15 ml        Allergies:  Allergies   Allergen Reactions    Aspirin Hives        Medications:  Scheduled medications  acetaminophen, 650 mg, oral, q6h  amoxicillin-pot clavulanate, 1 tablet, oral, q12h LACHO  atorvastatin, 40 mg, oral, Nightly  enoxaparin, 40 mg, subcutaneous,  q24h  metoprolol tartrate, 25 mg, oral, BID  polyethylene glycol, 17 g, oral, BID  scopolamine, 1 patch, transdermal, q72h  sennosides-docusate sodium, 2 tablet, oral, BID      Continuous medications     PRN medications  PRN medications: HYDROmorphone, HYDROmorphone, metoclopramide, ondansetron, traZODone       Labs:  Results for orders placed or performed during the hospital encounter of 09/21/24 (from the past 24 hour(s))   Renal Function Panel   Result Value Ref Range    Glucose 136 (H) 74 - 99 mg/dL    Sodium 136 136 - 145 mmol/L    Potassium 3.5 3.5 - 5.3 mmol/L    Chloride 102 98 - 107 mmol/L    Bicarbonate 26 21 - 32 mmol/L    Anion Gap 12 10 - 20 mmol/L    Urea Nitrogen 15 6 - 23 mg/dL    Creatinine 0.85 0.50 - 1.30 mg/dL    eGFR >90 >60 mL/min/1.73m*2    Calcium 9.2 8.6 - 10.3 mg/dL    Phosphorus 2.7 2.5 - 4.9 mg/dL    Albumin 3.8 3.4 - 5.0 g/dL   Magnesium   Result Value Ref Range    Magnesium 1.87 1.60 - 2.40 mg/dL   CBC   Result Value Ref Range    WBC 10.0 4.4 - 11.3 x10*3/uL    nRBC 0.0 0.0 - 0.0 /100 WBCs    RBC 5.16 4.50 - 5.90 x10*6/uL    Hemoglobin 15.6 13.5 - 17.5 g/dL    Hematocrit 47.3 41.0 - 52.0 %    MCV 92 80 - 100 fL    MCH 30.2 26.0 - 34.0 pg    MCHC 33.0 32.0 - 36.0 g/dL    RDW 13.2 11.5 - 14.5 %    Platelets 258 150 - 450 x10*3/uL        Imaging:  XR abdomen 1 view    Result Date: 9/25/2024  Interpreted By:  Rolando Painting, STUDY: XR ABDOMEN 1 VIEW;  9/25/2024 10:16 am   INDICATION: Signs/Symptoms:Ileus..   COMPARISON: 09/23/2024   ACCESSION NUMBER(S): [id number]   ORDERING CLINICIAN: YUMIKO BURRIS   FINDINGS: There is increased gaseous distention of the stomach. Mild gaseous distention of the transverse colon unchanged. No dilated small bowel is visualized.       Increasing gaseous distention of the stomach.   MACRO: None   Signed by: Rolando Painting 9/25/2024 11:57 AM Dictation workstation:   [id number]      Plan  Biliary sludge   #Ileus - resolved    POD#4:  S/p Laparoscopic  Appendectomy and Cholecystectomy Laparoscopy     - surgery as above  - avss  - Diet: advance to low fat diet.  - Pain control  - Nausea: antiemetics PRN  - Encourage OOB and IS  - Lovenox for DVT prophylaxis   - Maintain MAEVE drain - will remove prior to discharge   - ABX: transitioned to augmentin.  Per Dr. Alicia - patient to have antibiotics x4 days, no longer needed after that if WBC normal.      Plan of care discussed with Dr. Hill and if patient tolerates food, can discharge from surgery standpoint.  Will follow up with Dr. Alicia in 2 weeks. Please call with any concerns.     ODALYS Camacho-CNP    I spent 20 minutes in the professional and overall care of this patient.

## 2024-10-08 ENCOUNTER — APPOINTMENT (OUTPATIENT)
Dept: SURGERY | Facility: CLINIC | Age: 74
End: 2024-10-08
Payer: MEDICARE

## 2024-10-08 ENCOUNTER — TELEPHONE (OUTPATIENT)
Dept: SURGERY | Facility: CLINIC | Age: 74
End: 2024-10-08

## 2024-10-08 VITALS
SYSTOLIC BLOOD PRESSURE: 121 MMHG | HEART RATE: 100 BPM | DIASTOLIC BLOOD PRESSURE: 81 MMHG | HEIGHT: 70 IN | WEIGHT: 207 LBS | OXYGEN SATURATION: 98 % | RESPIRATION RATE: 17 BRPM | BODY MASS INDEX: 29.63 KG/M2 | TEMPERATURE: 97.9 F

## 2024-10-08 DIAGNOSIS — K63.5 SERRATED POLYP OF COLON: Primary | ICD-10-CM

## 2024-10-08 DIAGNOSIS — Z09 POSTOPERATIVE EXAMINATION: ICD-10-CM

## 2024-10-08 PROBLEM — E78.00 PURE HYPERCHOLESTEROLEMIA: Status: ACTIVE | Noted: 2024-10-08

## 2024-10-08 PROBLEM — I25.2 HISTORY OF ACUTE ANTERIOR WALL MI: Status: ACTIVE | Noted: 2021-02-01

## 2024-10-08 PROBLEM — D12.6 ADENOMATOUS POLYP OF COLON: Status: ACTIVE | Noted: 2024-10-08

## 2024-10-08 PROBLEM — I25.10 ARTERIOSCLEROSIS OF CORONARY ARTERY: Status: ACTIVE | Noted: 2024-10-08

## 2024-10-08 PROBLEM — N63.20 LEFT BREAST LUMP: Status: ACTIVE | Noted: 2024-10-08

## 2024-10-08 PROBLEM — E78.5 HYPERLIPIDEMIA: Status: ACTIVE | Noted: 2024-10-08

## 2024-10-08 PROBLEM — I10 HYPERTENSION: Status: ACTIVE | Noted: 2024-10-08

## 2024-10-08 LAB
LABORATORY COMMENT REPORT: NORMAL
PATH REPORT.FINAL DX SPEC: NORMAL
PATH REPORT.GROSS SPEC: NORMAL
PATH REPORT.TOTAL CANCER: NORMAL

## 2024-10-08 PROCEDURE — 3074F SYST BP LT 130 MM HG: CPT | Performed by: SURGERY

## 2024-10-08 PROCEDURE — 1125F AMNT PAIN NOTED PAIN PRSNT: CPT | Performed by: SURGERY

## 2024-10-08 PROCEDURE — 1111F DSCHRG MED/CURRENT MED MERGE: CPT | Performed by: SURGERY

## 2024-10-08 PROCEDURE — 99024 POSTOP FOLLOW-UP VISIT: CPT | Performed by: SURGERY

## 2024-10-08 PROCEDURE — 3079F DIAST BP 80-89 MM HG: CPT | Performed by: SURGERY

## 2024-10-08 PROCEDURE — 1159F MED LIST DOCD IN RCRD: CPT | Performed by: SURGERY

## 2024-10-08 RX ORDER — SPIRONOLACTONE 25 MG/1
0.5 TABLET ORAL
COMMUNITY

## 2024-10-08 RX ORDER — LISINOPRIL 2.5 MG/1
1 TABLET ORAL DAILY
COMMUNITY
Start: 2021-02-12

## 2024-10-08 ASSESSMENT — PAIN SCALES - GENERAL: PAINLEVEL: 2

## 2024-10-08 NOTE — TELEPHONE ENCOUNTER
Left a detailed message along with my direct phone number for return call regarding scheduling his colonoscopy and an appointment to discuss his right inguinal hernia.  Upon return call will schedule appropriately.

## 2024-10-08 NOTE — PROGRESS NOTES
"Subjective   The patient is status post Laparoscopic Appendectomy and Cholecystectomy Laparoscopy.  The patient is not having any pain. Patient is eating well, without constipation, and voiding without difficulty.    Objective   Physical Exam:  /81 (BP Location: Left arm, Patient Position: Sitting, BP Cuff Size: Adult long)   Pulse 100   Temp 36.6 °C (97.9 °F) (Temporal)   Resp 17   Ht 1.778 m (5' 10\")   Wt 93.9 kg (207 lb)   SpO2 98%   BMI 29.70 kg/m²   Constitutional: alert, not in acute distress, well developed, and well nourished  Abdomen: soft, bowel sounds active, non-tender, no abnormal masses, no hernias noted  Incision(s): healing well, no drainage, no erythema, well approximated  Tenderness at incision site: none  Pathology: Serated dysplastic polyp of appendix and gangrenous cholecystitis.    Assessment/Plan   Diagnoses and all orders for this visit:  Serrated polyp of colon  -     Colonoscopy Screening; High Risk Patient; Future    Colonoscopy every 5 years. Due now. Nicolas preferred.  Right inguinal hernia repair when recovered. Discussed and agreed to proceed in January.  "

## 2024-10-09 DIAGNOSIS — Z12.11 COLON CANCER SCREENING: Primary | ICD-10-CM

## 2024-10-09 RX ORDER — SODIUM, POTASSIUM,MAG SULFATES 17.5-3.13G
SOLUTION, RECONSTITUTED, ORAL ORAL
Qty: 354 ML | Refills: 0 | Status: SHIPPED | OUTPATIENT
Start: 2024-10-09

## 2024-10-09 NOTE — TELEPHONE ENCOUNTER
Left a detailed message and direct phone number regarding colonoscopy prep Suprep, and that instructions were placed in the mail.  Upon return call with any questions they will be addressed and answered.

## 2024-10-15 ENCOUNTER — APPOINTMENT (OUTPATIENT)
Dept: PRIMARY CARE | Facility: CLINIC | Age: 74
End: 2024-10-15
Payer: MEDICARE

## 2024-11-01 ENCOUNTER — DOCUMENTATION (OUTPATIENT)
Dept: PRIMARY CARE | Facility: CLINIC | Age: 74
End: 2024-11-01

## 2024-11-01 ENCOUNTER — APPOINTMENT (OUTPATIENT)
Dept: PRIMARY CARE | Facility: CLINIC | Age: 74
End: 2024-11-01
Payer: MEDICARE

## 2024-11-01 ENCOUNTER — TELEPHONE (OUTPATIENT)
Dept: SURGERY | Facility: CLINIC | Age: 74
End: 2024-11-01

## 2024-11-26 ENCOUNTER — TELEPHONE (OUTPATIENT)
Dept: SURGERY | Facility: CLINIC | Age: 74
End: 2024-11-26
Payer: MEDICARE

## 2024-11-26 NOTE — TELEPHONE ENCOUNTER
Left a detailed message with patient's wife regarding the appointment scheduled for December 5.  If it's to discuss surgery for hernia repair than the appointment would be too early and would need rescheduled.  Upon return call will reschedule if appropriate.

## 2024-12-03 ENCOUNTER — APPOINTMENT (OUTPATIENT)
Dept: SURGERY | Facility: CLINIC | Age: 74
End: 2024-12-03
Payer: MEDICARE

## 2024-12-04 ENCOUNTER — ANESTHESIA EVENT (OUTPATIENT)
Dept: OPERATING ROOM | Facility: CLINIC | Age: 74
End: 2024-12-04
Payer: MEDICARE

## 2024-12-04 ENCOUNTER — ANESTHESIA (OUTPATIENT)
Dept: OPERATING ROOM | Facility: CLINIC | Age: 74
End: 2024-12-04
Payer: MEDICARE

## 2024-12-04 ENCOUNTER — HOSPITAL ENCOUNTER (OUTPATIENT)
Dept: OPERATING ROOM | Facility: CLINIC | Age: 74
Discharge: HOME | End: 2024-12-04
Payer: MEDICARE

## 2024-12-04 VITALS
WEIGHT: 193.12 LBS | BODY MASS INDEX: 27.04 KG/M2 | RESPIRATION RATE: 18 BRPM | HEART RATE: 87 BPM | OXYGEN SATURATION: 95 % | SYSTOLIC BLOOD PRESSURE: 143 MMHG | DIASTOLIC BLOOD PRESSURE: 80 MMHG | TEMPERATURE: 97.2 F | HEIGHT: 71 IN

## 2024-12-04 DIAGNOSIS — K63.5 SERRATED POLYP OF COLON: ICD-10-CM

## 2024-12-04 PROCEDURE — 3600000002 HC OR TIME - INITIAL BASE CHARGE - PROCEDURE LEVEL TWO: Performed by: ANESTHESIOLOGY

## 2024-12-04 PROCEDURE — 3600000007 HC OR TIME - EACH INCREMENTAL 1 MINUTE - PROCEDURE LEVEL TWO: Performed by: ANESTHESIOLOGY

## 2024-12-04 PROCEDURE — A45385 PR COLONOSCOPY,REMV LESN,SNARE: Performed by: NURSE ANESTHETIST, CERTIFIED REGISTERED

## 2024-12-04 PROCEDURE — 7100000010 HC PHASE TWO TIME - EACH INCREMENTAL 1 MINUTE: Performed by: ANESTHESIOLOGY

## 2024-12-04 PROCEDURE — 2500000005 HC RX 250 GENERAL PHARMACY W/O HCPCS: Performed by: SURGERY

## 2024-12-04 PROCEDURE — 45380 COLONOSCOPY AND BIOPSY: CPT | Performed by: SURGERY

## 2024-12-04 PROCEDURE — 3700000002 HC GENERAL ANESTHESIA TIME - EACH INCREMENTAL 1 MINUTE: Performed by: ANESTHESIOLOGY

## 2024-12-04 PROCEDURE — 3700000001 HC GENERAL ANESTHESIA TIME - INITIAL BASE CHARGE: Performed by: ANESTHESIOLOGY

## 2024-12-04 PROCEDURE — 2500000004 HC RX 250 GENERAL PHARMACY W/ HCPCS (ALT 636 FOR OP/ED): Performed by: NURSE ANESTHETIST, CERTIFIED REGISTERED

## 2024-12-04 PROCEDURE — 99100 ANES PT EXTEME AGE<1 YR&>70: CPT | Performed by: ANESTHESIOLOGY

## 2024-12-04 PROCEDURE — A45385 PR COLONOSCOPY,REMV LESN,SNARE: Performed by: ANESTHESIOLOGY

## 2024-12-04 PROCEDURE — 7100000009 HC PHASE TWO TIME - INITIAL BASE CHARGE: Performed by: ANESTHESIOLOGY

## 2024-12-04 PROCEDURE — 45385 COLONOSCOPY W/LESION REMOVAL: CPT | Performed by: SURGERY

## 2024-12-04 RX ORDER — LIDOCAINE HYDROCHLORIDE 10 MG/ML
0.1 INJECTION, SOLUTION EPIDURAL; INFILTRATION; INTRACAUDAL; PERINEURAL ONCE
Status: DISCONTINUED | OUTPATIENT
Start: 2024-12-04 | End: 2024-12-05 | Stop reason: HOSPADM

## 2024-12-04 RX ORDER — LIDOCAINE HYDROCHLORIDE 20 MG/ML
INJECTION, SOLUTION INFILTRATION; PERINEURAL AS NEEDED
Status: DISCONTINUED | OUTPATIENT
Start: 2024-12-04 | End: 2024-12-04

## 2024-12-04 RX ORDER — ACETAMINOPHEN 325 MG/1
650 TABLET ORAL EVERY 4 HOURS PRN
Status: DISCONTINUED | OUTPATIENT
Start: 2024-12-04 | End: 2024-12-05 | Stop reason: HOSPADM

## 2024-12-04 RX ORDER — ONDANSETRON HYDROCHLORIDE 2 MG/ML
4 INJECTION, SOLUTION INTRAVENOUS ONCE AS NEEDED
Status: DISCONTINUED | OUTPATIENT
Start: 2024-12-04 | End: 2024-12-05 | Stop reason: HOSPADM

## 2024-12-04 RX ORDER — ALBUTEROL SULFATE 0.83 MG/ML
2.5 SOLUTION RESPIRATORY (INHALATION) ONCE AS NEEDED
Status: DISCONTINUED | OUTPATIENT
Start: 2024-12-04 | End: 2024-12-05 | Stop reason: HOSPADM

## 2024-12-04 RX ORDER — WATER 1 ML/ML
IRRIGANT IRRIGATION AS NEEDED
Status: COMPLETED | OUTPATIENT
Start: 2024-12-04 | End: 2024-12-04

## 2024-12-04 RX ORDER — PROPOFOL 10 MG/ML
INJECTION, EMULSION INTRAVENOUS AS NEEDED
Status: DISCONTINUED | OUTPATIENT
Start: 2024-12-04 | End: 2024-12-04

## 2024-12-04 SDOH — HEALTH STABILITY: MENTAL HEALTH: CURRENT SMOKER: 1

## 2024-12-04 ASSESSMENT — ENCOUNTER SYMPTOMS
FEVER: 0
ABDOMINAL PAIN: 0
SPEECH DIFFICULTY: 0
HEADACHES: 0
CONSTITUTIONAL NEGATIVE: 1
LIGHT-HEADEDNESS: 0
NAUSEA: 0
CONSTIPATION: 0
WHEEZING: 0
CHILLS: 0
JOINT SWELLING: 0
ARTHRALGIAS: 0
COUGH: 0
ABDOMINAL DISTENTION: 0
DIZZINESS: 0
CONFUSION: 0
DIARRHEA: 0
DIFFICULTY URINATING: 0
UNEXPECTED WEIGHT CHANGE: 0
SLEEP DISTURBANCE: 0
TROUBLE SWALLOWING: 0
COLOR CHANGE: 0
VOMITING: 0
SHORTNESS OF BREATH: 0

## 2024-12-04 ASSESSMENT — PAIN - FUNCTIONAL ASSESSMENT
PAIN_FUNCTIONAL_ASSESSMENT: 0-10

## 2024-12-04 ASSESSMENT — PAIN SCALES - GENERAL
PAIN_LEVEL: 0
PAINLEVEL_OUTOF10: 0 - NO PAIN

## 2024-12-04 NOTE — ANESTHESIA POSTPROCEDURE EVALUATION
Patient: Brian Ayon    Procedure Summary       Date: 12/04/24 Room / Location: [facility] OR    Anesthesia Start: 1407 Anesthesia Stop: 1441    Procedure: COLONOSCOPY Diagnosis: Serrated polyp of colon    Scheduled Providers: Prashant Alicia MD PhD Responsible Provider: Umm Bauman MD    Anesthesia Type: MAC ASA Status: 3            Anesthesia Type: MAC    Vitals Value Taken Time   BP 96/54 12/04/24 1440   Temp 36 °C (96.8 °F) 12/04/24 1440   Pulse 90 12/04/24 1440   Resp 16 12/04/24 1440   SpO2 94 % 12/04/24 1440       Anesthesia Post Evaluation    Patient location during evaluation: PACU  Patient participation: waiting for patient participation  Level of consciousness: sedated  Pain score: 0  Pain management: adequate  Airway patency: patent  Cardiovascular status: acceptable  Respiratory status: acceptable  Hydration status: acceptable  Postoperative Nausea and Vomiting: none        There were no known notable events for this encounter.

## 2024-12-04 NOTE — H&P
History Of Present Illness  Brian Ayon is a 74 y.o. male presenting with need for colonoscopy.    Underwent 09/22/2024 lap appendectomy and cholecystectomy. Chronic appendicitis and gangrenous cholecystitis - both removed, and large inguinal hernias.     Screening colonoscopy every 5 years. Last one OSH.     Past Medical History  Past Medical History:   Diagnosis Date    Coronary artery disease     Other conditions influencing health status     No significant past medical history     Surgical History  Past Surgical History:   Procedure Laterality Date    CORONARY ANGIOPLASTY WITH STENT PLACEMENT  10/11/2016    Cath Placement Of Stent 1     Social History  He reports that he has been smoking cigars. He started smoking about 30 years ago. He has been exposed to tobacco smoke. He has quit using smokeless tobacco.  His smokeless tobacco use included chew. He reports that he does not currently use alcohol. He reports that he does not use drugs.    Family History  No family history on file.     Allergies  Allergies   Allergen Reactions    Aspirin Hives     Review of Systems   Constitutional: Negative.  Negative for chills, fever and unexpected weight change.   HENT: Negative.  Negative for congestion and trouble swallowing.    Respiratory:  Negative for cough, shortness of breath and wheezing.    Cardiovascular:  Negative for chest pain.   Gastrointestinal:  Negative for abdominal distention, abdominal pain, constipation, diarrhea, nausea and vomiting.   Genitourinary:  Negative for difficulty urinating.   Musculoskeletal:  Negative for arthralgias and joint swelling.   Skin:  Negative for color change.   Neurological:  Negative for dizziness, speech difficulty, light-headedness and headaches.   Psychiatric/Behavioral:  Negative for confusion and sleep disturbance.         Physical Exam  Constitutional:       General: He is awake.      Appearance: Normal appearance.   HENT:      Head: Normocephalic and atraumatic.       "Nose: Nose normal.      Mouth/Throat:      Mouth: Mucous membranes are moist.   Eyes:      Pupils: Pupils are equal, round, and reactive to light.   Neck:      Thyroid: No thyroid mass.      Trachea: Phonation normal.   Cardiovascular:      Rate and Rhythm: Normal rate and regular rhythm.      Heart sounds: Normal heart sounds. No murmur heard.     No gallop.   Pulmonary:      Effort: Pulmonary effort is normal. No respiratory distress.      Breath sounds: Normal breath sounds and air entry. No decreased breath sounds, wheezing, rhonchi or rales.   Abdominal:      General: Bowel sounds are normal. There is no distension.      Palpations: Abdomen is soft.      Tenderness: There is no abdominal tenderness. There is no guarding or rebound.   Musculoskeletal:      Cervical back: Neck supple.      Right lower leg: No edema.      Left lower leg: No edema.   Skin:     General: Skin is warm.      Capillary Refill: Capillary refill takes less than 2 seconds.   Neurological:      General: No focal deficit present.      Mental Status: He is alert and oriented to person, place, and time. Mental status is at baseline.      Cranial Nerves: Cranial nerves 2-12 are intact.      Motor: Motor function is intact.   Psychiatric:         Attention and Perception: Attention and perception normal.         Mood and Affect: Mood normal.         Speech: Speech normal.         Behavior: Behavior normal.         Thought Content: Thought content normal.         Judgment: Judgment normal.          Last Recorded Vitals  Blood pressure 141/88, pulse (!) 111, temperature 35.9 °C (96.6 °F), temperature source Temporal, resp. rate 16, height 1.803 m (5' 11\"), weight 87.6 kg (193 lb 2 oz), SpO2 96%.    Assessment/Plan   Proceed with colonoscopy.     Prashant Alicia MD PhD  "

## 2024-12-04 NOTE — DISCHARGE INSTRUCTIONS
During the first 24 hours after your procedure, you should:    - Resume normal diet, unless otherwise directed by your doctor.  - Resume your home medications, unless otherwise directed by your doctor.  - Refrain from driving or operative heavy machinery.  - Drink plenty of liquids.  - Avoid consuming alcohol.  - Avoid strenuous activity or heavy lifting.    After 24 hours, you can resume regular activity.    Call your doctor office immediately ([phone]) or come to the nearest emergency room if you experience:    - Abdominal tenderness  - Blood in your stool or vomit  - Difficulty urinating or passing stools  - Difficulty breathing  - Chest pain  - Fever       If you experience any problems or have any questions following discharge from the GI Lab, please call:   Dr. Alicia at [phone].   To reach your physician after hours call [phone] and ask for the GI physician on call.

## 2024-12-04 NOTE — ANESTHESIA PREPROCEDURE EVALUATION
Patient: Brian Ayon    Procedure Information       Date/Time: 12/04/24 1410    Scheduled providers: Prashant Alicia MD PhD    Procedure: COLONOSCOPY    Location: [facility] OR          73 y/o male with h/o  CAD s/p cardiac stents, HTN, HLD here for colonoscopy.     Patient denies any medications for HTN or HLD.  Does not take any prescribed medications at home.    Cardiac history per EMR:   History of ant STEMI [facility] treated with primary PCI to LAD - 2/2021, on DAPT   Remote PCI LAD [place] ~ 2010  Other CAD: osteal LM 25%, osteal LAD 50%, sub-branch of OM1 has osteal 80%, moderate distal dominant Circ , all management and close monitoring  Hypertension on Lisinopril 2.5 daily, Metoprolol tartrate 25 BID  Hyperlipidemia  on 2/7/2023 1, started Lipitor 80        Relevant Problems   Cardiac   (+) Arteriosclerosis of coronary artery   (+) Hyperlipidemia   (+) Hypertension   (+) Pure hypercholesterolemia      Liver   (+) Biliary sludge     Vitals:    12/04/24 1255   BP: 141/88   Pulse: (!) 111   Resp: 16   Temp: 35.9 °C (96.6 °F)   SpO2: 96%       Past Surgical History:   Procedure Laterality Date    CORONARY ANGIOPLASTY WITH STENT PLACEMENT  10/11/2016    Cath Placement Of Stent 1     Past Medical History:   Diagnosis Date    Coronary artery disease     Other conditions influencing health status     No significant past medical history       Current Outpatient Medications:     atorvastatin (Lipitor) 40 mg tablet, Take 1 tablet (40 mg) by mouth once daily at bedtime. (Patient not taking: Reported on 10/8/2024), Disp: 30 tablet, Rfl: 0    lisinopril 2.5 mg tablet, Take 1 tablet (2.5 mg) by mouth once daily., Disp: , Rfl:     metoprolol tartrate (Lopressor) 25 mg tablet, Take 1 tablet (25 mg) by mouth 2 times a day. (Patient not taking: Reported on 10/8/2024), Disp: 60 tablet, Rfl: 0    ondansetron ODT (Zofran-ODT) 4 mg disintegrating tablet, Take 1 tablet (4 mg) by mouth every 8 hours if needed  for nausea or vomiting. (Patient not taking: Reported on 10/8/2024), Disp: 20 tablet, Rfl: 0    sodium,potassium,mag sulfates (Suprep) 17.5-3.13-1.6 gram recon soln solution, Take one bottle twice as directed by the prep instructions, Disp: 354 mL, Rfl: 0    spironolactone (Aldactone) 25 mg tablet, Take 0.5 mg by mouth once daily., Disp: , Rfl:     ticagrelor (Brilinta) 90 mg tablet, Take by mouth twice a day., Disp: , Rfl:   Prior to Admission medications    Medication Sig Start Date End Date Taking? Authorizing Provider   atorvastatin (Lipitor) 40 mg tablet Take 1 tablet (40 mg) by mouth once daily at bedtime.  Patient not taking: Reported on 10/8/2024 9/26/24 10/26/24  Gloria Okeefe DO   lisinopril 2.5 mg tablet Take 1 tablet (2.5 mg) by mouth once daily. 2/12/21   Historical Provider, MD   metoprolol tartrate (Lopressor) 25 mg tablet Take 1 tablet (25 mg) by mouth 2 times a day.  Patient not taking: Reported on 10/8/2024 9/26/24 10/26/24  Gloria Okeefe DO   ondansetron ODT (Zofran-ODT) 4 mg disintegrating tablet Take 1 tablet (4 mg) by mouth every 8 hours if needed for nausea or vomiting.  Patient not taking: Reported on 10/8/2024 9/26/24   Gloria Okeefe DO   sodium,potassium,mag sulfates (Suprep) 17.5-3.13-1.6 gram recon soln solution Take one bottle twice as directed by the prep instructions 10/9/24   Prashant Alicia MD PhD   spironolactone (Aldactone) 25 mg tablet Take 0.5 mg by mouth once daily.    Historical Provider, MD   ticagrelor (Brilinta) 90 mg tablet Take by mouth twice a day. 2/12/21   Historical Provider, MD     Allergies   Allergen Reactions    Aspirin Hives     Social History     Tobacco Use    Smoking status: Some Days     Types: Cigars     Start date: 1994     Passive exposure: Current    Smokeless tobacco: Former     Types: Chew   Substance Use Topics    Alcohol use: Not Currently         Chemistry    Lab Results   Component Value Date/Time     09/26/2024 0649    K 3.5  09/26/2024 0649     09/26/2024 0649    CO2 26 09/26/2024 0649    BUN 15 09/26/2024 0649    CREATININE 0.85 09/26/2024 0649    Lab Results   Component Value Date/Time    CALCIUM 9.2 09/26/2024 0649    ALKPHOS 48 09/23/2024 0507    AST 33 09/23/2024 0507    ALT 28 09/23/2024 0507    BILITOT 2.0 (H) 09/23/2024 0507          Lab Results   Component Value Date/Time    WBC 10.0 09/26/2024 0649    HGB 15.6 09/26/2024 0649    HCT 47.3 09/26/2024 0649     09/26/2024 0649     Lab Results   Component Value Date/Time    PROTIME 13.7 (H) 02/11/2021 0438    INR 1.2 (H) 02/11/2021 0438     Encounter Date: 09/21/24   ECG 12 lead   Result Value    Ventricular Rate 77    Atrial Rate 77    WI Interval 150    QRS Duration 86    QT Interval 374    QTC Calculation(Bazett) 423    P Axis 65    R Axis -50    T Axis 37    QRS Count 13    Q Onset 219    P Onset 144    P Offset 199    T Offset 406    QTC Fredericia 406    Narrative    Normal sinus rhythm  Left axis deviation  Low voltage QRS  Inferior infarct (cited on or before 10-FEB-2021)  Abnormal ECG  When compared with ECG of 02-MAR-2022 09:28,  Premature atrial complexes are no longer Present  Criteria for Anterolateral infarct are no longer Present  Confirmed by Maame Shaw ([id number]) on 9/25/2024 7:14:31 AM       Clinical information reviewed:   Tobacco  Allergies  Meds  Problems  Med Hx  Surg Hx   Fam Hx  Soc   Hx        NPO Detail:  No data recorded     Physical Exam    Airway  Mallampati: III  TM distance: <3 FB  Neck ROM: full     Cardiovascular   Rhythm: regular     Dental        Pulmonary    Abdominal   (+) obese             Anesthesia Plan    History of general anesthesia?: yes  History of complications of general anesthesia?: no    ASA 3     MAC   (GA-TIVA)  The patient is a current smoker.  Patient was previously instructed to abstain from smoking on day of procedure.    intravenous induction   Anesthetic plan and risks discussed with patient and  spouse.    Plan discussed with CRNA and attending.

## 2024-12-05 ENCOUNTER — APPOINTMENT (OUTPATIENT)
Dept: SURGERY | Facility: CLINIC | Age: 74
End: 2024-12-05
Payer: MEDICARE

## 2024-12-05 ASSESSMENT — PAIN SCALES - GENERAL: PAINLEVEL_OUTOF10: 0 - NO PAIN

## 2024-12-12 ENCOUNTER — APPOINTMENT (OUTPATIENT)
Dept: SURGERY | Facility: CLINIC | Age: 74
End: 2024-12-12
Payer: MEDICARE

## 2025-01-08 PROBLEM — I70.202: Status: ACTIVE | Noted: 2025-01-08

## 2025-01-09 ENCOUNTER — PREP FOR PROCEDURE (OUTPATIENT)
Dept: SURGERY | Facility: HOSPITAL | Age: 75
End: 2025-01-09

## 2025-01-09 ENCOUNTER — APPOINTMENT (OUTPATIENT)
Dept: SURGERY | Facility: CLINIC | Age: 75
End: 2025-01-09
Payer: MEDICARE

## 2025-01-09 VITALS
TEMPERATURE: 97.9 F | SYSTOLIC BLOOD PRESSURE: 141 MMHG | BODY MASS INDEX: 27.75 KG/M2 | RESPIRATION RATE: 18 BRPM | WEIGHT: 198.2 LBS | OXYGEN SATURATION: 97 % | HEART RATE: 89 BPM | HEIGHT: 71 IN | DIASTOLIC BLOOD PRESSURE: 82 MMHG

## 2025-01-09 DIAGNOSIS — Z71.9 ENCOUNTER FOR CONSULTATION: ICD-10-CM

## 2025-01-09 DIAGNOSIS — K40.90 RIGHT INGUINAL HERNIA: Primary | ICD-10-CM

## 2025-01-09 PROCEDURE — 3079F DIAST BP 80-89 MM HG: CPT | Performed by: SURGERY

## 2025-01-09 PROCEDURE — 99212 OFFICE O/P EST SF 10 MIN: CPT | Performed by: SURGERY

## 2025-01-09 PROCEDURE — 1126F AMNT PAIN NOTED NONE PRSNT: CPT | Performed by: SURGERY

## 2025-01-09 PROCEDURE — 1159F MED LIST DOCD IN RCRD: CPT | Performed by: SURGERY

## 2025-01-09 PROCEDURE — 1036F TOBACCO NON-USER: CPT | Performed by: SURGERY

## 2025-01-09 PROCEDURE — 3077F SYST BP >= 140 MM HG: CPT | Performed by: SURGERY

## 2025-01-09 PROCEDURE — 3008F BODY MASS INDEX DOCD: CPT | Performed by: SURGERY

## 2025-01-09 RX ORDER — SCOPOLAMINE 1 MG/3D
1 PATCH, EXTENDED RELEASE TRANSDERMAL
OUTPATIENT
Start: 2025-01-09 | End: 2025-01-12

## 2025-01-09 RX ORDER — GABAPENTIN 300 MG/1
600 CAPSULE ORAL ONCE
OUTPATIENT
Start: 2025-01-09 | End: 2025-01-09

## 2025-01-09 RX ORDER — HEPARIN SODIUM 5000 [USP'U]/ML
5000 INJECTION, SOLUTION INTRAVENOUS; SUBCUTANEOUS ONCE
OUTPATIENT
Start: 2025-01-09 | End: 2025-01-09

## 2025-01-09 RX ORDER — CEFAZOLIN SODIUM 2 G/100ML
2 INJECTION, SOLUTION INTRAVENOUS ONCE
OUTPATIENT
Start: 2025-01-09 | End: 2025-01-09

## 2025-01-09 RX ORDER — ACETAMINOPHEN 325 MG/1
975 TABLET ORAL ONCE
OUTPATIENT
Start: 2025-01-09 | End: 2025-01-09

## 2025-01-09 ASSESSMENT — PAIN SCALES - GENERAL: PAINLEVEL_OUTOF10: 0-NO PAIN

## 2025-01-09 ASSESSMENT — ENCOUNTER SYMPTOMS
CONSTITUTIONAL NEGATIVE: 1
CARDIOVASCULAR NEGATIVE: 1
ENDOCRINE NEGATIVE: 1
RESPIRATORY NEGATIVE: 1
ABDOMINAL PAIN: 1
EYES NEGATIVE: 1

## 2025-01-09 NOTE — H&P (VIEW-ONLY)
Subjective   Patient ID: Brian Ayon is a 74 y.o. male who presents for Hernia (Right inguinal hernia).  HPI  75 YO M BMI 27    Underwent 09/22/2024 lap appendectomy and cholecystectomy. Chronic appendicitis and gangrenous cholecystitis - both removed, and large inguinal hernias.      Screening colonoscopy every 5 years. Last one with three TA on 12/04/2024.    Returned to clinic 01/09/2024. Feels worsening R > L bulges.    Review of Systems   Constitutional: Negative.    HENT: Negative.     Eyes: Negative.    Respiratory: Negative.     Cardiovascular: Negative.    Gastrointestinal:  Positive for abdominal pain.   Endocrine: Negative.    Genitourinary: Negative.        Objective   Physical Exam  Constitutional:       Appearance: Normal appearance.   HENT:      Head: Atraumatic.      Nose: Nose normal.      Mouth/Throat:      Mouth: Mucous membranes are moist.   Cardiovascular:      Rate and Rhythm: Normal rate and regular rhythm.   Pulmonary:      Effort: Pulmonary effort is normal.      Breath sounds: Normal breath sounds.   Abdominal:      General: There is no distension.      Palpations: Abdomen is soft.      Tenderness: There is no guarding or rebound.   Genitourinary:     Comments: R > L inguinal hernias.  Skin:     General: Skin is warm.   Neurological:      Mental Status: He is alert. Mental status is at baseline.   Psychiatric:         Mood and Affect: Mood normal.         Thought Content: Thought content normal.         Judgment: Judgment normal.         Assessment/Plan   Problem List Items Addressed This Visit             ICD-10-CM       Gastrointestinal and Abdominal    Right inguinal hernia - Primary K40.90    Relevant Orders    Case Request Operating Room: Repair Inguinal Hernia Robot-Assisted (Completed)     Other Visit Diagnoses         Codes    Encounter for consultation     Z71.9          Proceed with surgery.       Prashant Alicia MD PhD 01/09/25 2:20 PM

## 2025-01-09 NOTE — PROGRESS NOTES
Subjective   Patient ID: Brian Ayon is a 74 y.o. male who presents for Hernia (Right inguinal hernia).  HPI  73 YO M BMI 27    Underwent 09/22/2024 lap appendectomy and cholecystectomy. Chronic appendicitis and gangrenous cholecystitis - both removed, and large inguinal hernias.      Screening colonoscopy every 5 years. Last one with three TA on 12/04/2024.    Returned to clinic 01/09/2024. Feels worsening R > L bulges.    Review of Systems   Constitutional: Negative.    HENT: Negative.     Eyes: Negative.    Respiratory: Negative.     Cardiovascular: Negative.    Gastrointestinal:  Positive for abdominal pain.   Endocrine: Negative.    Genitourinary: Negative.        Objective   Physical Exam  Constitutional:       Appearance: Normal appearance.   HENT:      Head: Atraumatic.      Nose: Nose normal.      Mouth/Throat:      Mouth: Mucous membranes are moist.   Cardiovascular:      Rate and Rhythm: Normal rate and regular rhythm.   Pulmonary:      Effort: Pulmonary effort is normal.      Breath sounds: Normal breath sounds.   Abdominal:      General: There is no distension.      Palpations: Abdomen is soft.      Tenderness: There is no guarding or rebound.   Genitourinary:     Comments: R > L inguinal hernias.  Skin:     General: Skin is warm.   Neurological:      Mental Status: He is alert. Mental status is at baseline.   Psychiatric:         Mood and Affect: Mood normal.         Thought Content: Thought content normal.         Judgment: Judgment normal.         Assessment/Plan   Problem List Items Addressed This Visit             ICD-10-CM       Gastrointestinal and Abdominal    Right inguinal hernia - Primary K40.90    Relevant Orders    Case Request Operating Room: Repair Inguinal Hernia Robot-Assisted (Completed)     Other Visit Diagnoses         Codes    Encounter for consultation     Z71.9          Proceed with surgery.       Prashant Alicia MD PhD 01/09/25 2:20 PM

## 2025-01-27 ENCOUNTER — HOSPITAL ENCOUNTER (OUTPATIENT)
Facility: HOSPITAL | Age: 75
Setting detail: OUTPATIENT SURGERY
Discharge: HOME | End: 2025-01-27
Attending: SURGERY | Admitting: SURGERY
Payer: MEDICARE

## 2025-01-27 ENCOUNTER — ANESTHESIA (OUTPATIENT)
Dept: OPERATING ROOM | Facility: HOSPITAL | Age: 75
End: 2025-01-27
Payer: MEDICARE

## 2025-01-27 ENCOUNTER — ANESTHESIA EVENT (OUTPATIENT)
Dept: OPERATING ROOM | Facility: HOSPITAL | Age: 75
End: 2025-01-27
Payer: MEDICARE

## 2025-01-27 VITALS
TEMPERATURE: 98.6 F | DIASTOLIC BLOOD PRESSURE: 68 MMHG | OXYGEN SATURATION: 97 % | BODY MASS INDEX: 27.92 KG/M2 | RESPIRATION RATE: 16 BRPM | SYSTOLIC BLOOD PRESSURE: 114 MMHG | HEIGHT: 70 IN | HEART RATE: 66 BPM | WEIGHT: 195 LBS

## 2025-01-27 DIAGNOSIS — K40.90 RIGHT INGUINAL HERNIA: Primary | ICD-10-CM

## 2025-01-27 PROCEDURE — 7100000001 HC RECOVERY ROOM TIME - INITIAL BASE CHARGE: Performed by: SURGERY

## 2025-01-27 PROCEDURE — C1781 MESH (IMPLANTABLE): HCPCS | Performed by: SURGERY

## 2025-01-27 PROCEDURE — 3700000002 HC GENERAL ANESTHESIA TIME - EACH INCREMENTAL 1 MINUTE: Performed by: SURGERY

## 2025-01-27 PROCEDURE — 99100 ANES PT EXTEME AGE<1 YR&>70: CPT | Performed by: ANESTHESIOLOGY

## 2025-01-27 PROCEDURE — 3600000018 HC OR TIME - INITIAL BASE CHARGE - PROCEDURE LEVEL SIX: Performed by: SURGERY

## 2025-01-27 PROCEDURE — 7100000010 HC PHASE TWO TIME - EACH INCREMENTAL 1 MINUTE: Performed by: SURGERY

## 2025-01-27 PROCEDURE — 2720000007 HC OR 272 NO HCPCS: Performed by: SURGERY

## 2025-01-27 PROCEDURE — 3700000001 HC GENERAL ANESTHESIA TIME - INITIAL BASE CHARGE: Performed by: SURGERY

## 2025-01-27 PROCEDURE — 3600000017 HC OR TIME - EACH INCREMENTAL 1 MINUTE - PROCEDURE LEVEL SIX: Performed by: SURGERY

## 2025-01-27 PROCEDURE — 2500000004 HC RX 250 GENERAL PHARMACY W/ HCPCS (ALT 636 FOR OP/ED): Performed by: SURGERY

## 2025-01-27 PROCEDURE — 2500000002 HC RX 250 W HCPCS SELF ADMINISTERED DRUGS (ALT 637 FOR MEDICARE OP, ALT 636 FOR OP/ED)

## 2025-01-27 PROCEDURE — 2780000003 HC OR 278 NO HCPCS: Performed by: SURGERY

## 2025-01-27 PROCEDURE — 2500000004 HC RX 250 GENERAL PHARMACY W/ HCPCS (ALT 636 FOR OP/ED)

## 2025-01-27 PROCEDURE — 2500000001 HC RX 250 WO HCPCS SELF ADMINISTERED DRUGS (ALT 637 FOR MEDICARE OP): Performed by: SURGERY

## 2025-01-27 PROCEDURE — 2500000005 HC RX 250 GENERAL PHARMACY W/O HCPCS: Performed by: SURGERY

## 2025-01-27 PROCEDURE — A49650 PR LAP,INGUINAL HERNIA REPR,INITIAL: Performed by: ANESTHESIOLOGY

## 2025-01-27 PROCEDURE — 7100000002 HC RECOVERY ROOM TIME - EACH INCREMENTAL 1 MINUTE: Performed by: SURGERY

## 2025-01-27 PROCEDURE — 7100000009 HC PHASE TWO TIME - INITIAL BASE CHARGE: Performed by: SURGERY

## 2025-01-27 PROCEDURE — A49650 PR LAP,INGUINAL HERNIA REPR,INITIAL: Performed by: ANESTHESIOLOGIST ASSISTANT

## 2025-01-27 PROCEDURE — 49650 LAP ING HERNIA REPAIR INIT: CPT | Performed by: SURGERY

## 2025-01-27 PROCEDURE — 2500000004 HC RX 250 GENERAL PHARMACY W/ HCPCS (ALT 636 FOR OP/ED): Mod: JZ | Performed by: ANESTHESIOLOGY

## 2025-01-27 PROCEDURE — 96372 THER/PROPH/DIAG INJ SC/IM: CPT | Performed by: SURGERY

## 2025-01-27 DEVICE — LAPAROSCOPIC SELF-FIXATING MESH POLYESTER WITH POLYLACTIC ACID GRIPS AND COLLAGEN FILM
Type: IMPLANTABLE DEVICE | Site: INGUINAL | Status: FUNCTIONAL
Brand: PROGRIP

## 2025-01-27 RX ORDER — LABETALOL HYDROCHLORIDE 5 MG/ML
5 INJECTION, SOLUTION INTRAVENOUS ONCE AS NEEDED
Status: DISCONTINUED | OUTPATIENT
Start: 2025-01-27 | End: 2025-01-27 | Stop reason: HOSPADM

## 2025-01-27 RX ORDER — ACETAMINOPHEN 325 MG/1
650 TABLET ORAL EVERY 4 HOURS PRN
Status: DISCONTINUED | OUTPATIENT
Start: 2025-01-27 | End: 2025-01-27 | Stop reason: HOSPADM

## 2025-01-27 RX ORDER — CEFAZOLIN SODIUM 2 G/100ML
2 INJECTION, SOLUTION INTRAVENOUS ONCE
Status: DISCONTINUED | OUTPATIENT
Start: 2025-01-27 | End: 2025-01-27 | Stop reason: HOSPADM

## 2025-01-27 RX ORDER — MEPERIDINE HYDROCHLORIDE 50 MG/ML
12.5 INJECTION INTRAMUSCULAR; INTRAVENOUS; SUBCUTANEOUS EVERY 10 MIN PRN
Status: DISCONTINUED | OUTPATIENT
Start: 2025-01-27 | End: 2025-01-27 | Stop reason: HOSPADM

## 2025-01-27 RX ORDER — BUPIVACAINE HYDROCHLORIDE 5 MG/ML
INJECTION, SOLUTION PERINEURAL AS NEEDED
Status: DISCONTINUED | OUTPATIENT
Start: 2025-01-27 | End: 2025-01-27 | Stop reason: HOSPADM

## 2025-01-27 RX ORDER — MIDAZOLAM HYDROCHLORIDE 1 MG/ML
1 INJECTION, SOLUTION INTRAMUSCULAR; INTRAVENOUS ONCE AS NEEDED
Status: DISCONTINUED | OUTPATIENT
Start: 2025-01-27 | End: 2025-01-27 | Stop reason: HOSPADM

## 2025-01-27 RX ORDER — GABAPENTIN 300 MG/1
600 CAPSULE ORAL ONCE
Status: COMPLETED | OUTPATIENT
Start: 2025-01-27 | End: 2025-01-27

## 2025-01-27 RX ORDER — APREPITANT 40 MG/1
40 CAPSULE ORAL ONCE
Status: COMPLETED | OUTPATIENT
Start: 2025-01-27 | End: 2025-01-27

## 2025-01-27 RX ORDER — ACETAMINOPHEN 325 MG/1
975 TABLET ORAL ONCE
Status: COMPLETED | OUTPATIENT
Start: 2025-01-27 | End: 2025-01-27

## 2025-01-27 RX ORDER — ONDANSETRON HYDROCHLORIDE 2 MG/ML
4 INJECTION, SOLUTION INTRAVENOUS ONCE AS NEEDED
Status: DISCONTINUED | OUTPATIENT
Start: 2025-01-27 | End: 2025-01-27 | Stop reason: HOSPADM

## 2025-01-27 RX ORDER — ONDANSETRON 4 MG/1
8 TABLET, FILM COATED ORAL EVERY 8 HOURS PRN
Qty: 20 TABLET | Refills: 0 | Status: SHIPPED | OUTPATIENT
Start: 2025-01-27

## 2025-01-27 RX ORDER — SCOPOLAMINE 1 MG/3D
1 PATCH, EXTENDED RELEASE TRANSDERMAL
Status: DISCONTINUED | OUTPATIENT
Start: 2025-01-27 | End: 2025-01-27 | Stop reason: HOSPADM

## 2025-01-27 RX ORDER — WATER 1 ML/ML
IRRIGANT IRRIGATION AS NEEDED
Status: DISCONTINUED | OUTPATIENT
Start: 2025-01-27 | End: 2025-01-27 | Stop reason: HOSPADM

## 2025-01-27 RX ORDER — ONDANSETRON HYDROCHLORIDE 2 MG/ML
INJECTION, SOLUTION INTRAVENOUS AS NEEDED
Status: DISCONTINUED | OUTPATIENT
Start: 2025-01-27 | End: 2025-01-27

## 2025-01-27 RX ORDER — HEPARIN SODIUM 5000 [USP'U]/ML
5000 INJECTION, SOLUTION INTRAVENOUS; SUBCUTANEOUS ONCE
Status: COMPLETED | OUTPATIENT
Start: 2025-01-27 | End: 2025-01-27

## 2025-01-27 RX ORDER — PROPOFOL 10 MG/ML
INJECTION, EMULSION INTRAVENOUS AS NEEDED
Status: DISCONTINUED | OUTPATIENT
Start: 2025-01-27 | End: 2025-01-27

## 2025-01-27 RX ORDER — HYDRALAZINE HYDROCHLORIDE 20 MG/ML
5 INJECTION INTRAMUSCULAR; INTRAVENOUS EVERY 30 MIN PRN
Status: DISCONTINUED | OUTPATIENT
Start: 2025-01-27 | End: 2025-01-27 | Stop reason: HOSPADM

## 2025-01-27 RX ORDER — APREPITANT 40 MG/1
CAPSULE ORAL
Status: COMPLETED
Start: 2025-01-27 | End: 2025-01-27

## 2025-01-27 RX ORDER — LIDOCAINE HYDROCHLORIDE 20 MG/ML
INJECTION, SOLUTION INFILTRATION; PERINEURAL AS NEEDED
Status: DISCONTINUED | OUTPATIENT
Start: 2025-01-27 | End: 2025-01-27

## 2025-01-27 RX ORDER — SODIUM CHLORIDE, SODIUM LACTATE, POTASSIUM CHLORIDE, CALCIUM CHLORIDE 600; 310; 30; 20 MG/100ML; MG/100ML; MG/100ML; MG/100ML
100 INJECTION, SOLUTION INTRAVENOUS CONTINUOUS
Status: ACTIVE | OUTPATIENT
Start: 2025-01-27 | End: 2025-01-27

## 2025-01-27 RX ORDER — FENTANYL CITRATE 50 UG/ML
INJECTION, SOLUTION INTRAMUSCULAR; INTRAVENOUS AS NEEDED
Status: DISCONTINUED | OUTPATIENT
Start: 2025-01-27 | End: 2025-01-27

## 2025-01-27 RX ORDER — HYDROCODONE BITARTRATE AND ACETAMINOPHEN 5; 325 MG/1; MG/1
1 TABLET ORAL EVERY 6 HOURS PRN
Qty: 12 TABLET | Refills: 0 | Status: SHIPPED | OUTPATIENT
Start: 2025-01-27 | End: 2025-02-03

## 2025-01-27 RX ORDER — ALBUTEROL SULFATE 0.83 MG/ML
2.5 SOLUTION RESPIRATORY (INHALATION) ONCE AS NEEDED
Status: DISCONTINUED | OUTPATIENT
Start: 2025-01-27 | End: 2025-01-27 | Stop reason: HOSPADM

## 2025-01-27 RX ORDER — CEFAZOLIN 1 G/1
INJECTION, POWDER, FOR SOLUTION INTRAVENOUS AS NEEDED
Status: DISCONTINUED | OUTPATIENT
Start: 2025-01-27 | End: 2025-01-27

## 2025-01-27 RX ORDER — LIDOCAINE HYDROCHLORIDE 10 MG/ML
0.1 INJECTION, SOLUTION INFILTRATION; PERINEURAL ONCE
Status: DISCONTINUED | OUTPATIENT
Start: 2025-01-27 | End: 2025-01-27 | Stop reason: HOSPADM

## 2025-01-27 RX ORDER — ROCURONIUM BROMIDE 10 MG/ML
INJECTION, SOLUTION INTRAVENOUS AS NEEDED
Status: DISCONTINUED | OUTPATIENT
Start: 2025-01-27 | End: 2025-01-27

## 2025-01-27 RX ORDER — KETOROLAC TROMETHAMINE 30 MG/ML
INJECTION, SOLUTION INTRAMUSCULAR; INTRAVENOUS AS NEEDED
Status: DISCONTINUED | OUTPATIENT
Start: 2025-01-27 | End: 2025-01-27

## 2025-01-27 RX ADMIN — ROCURONIUM BROMIDE 20 MG: 10 INJECTION, SOLUTION INTRAVENOUS at 08:24

## 2025-01-27 RX ADMIN — KETOROLAC TROMETHAMINE 30 MG: 30 INJECTION, SOLUTION INTRAMUSCULAR at 08:58

## 2025-01-27 RX ADMIN — GABAPENTIN 600 MG: 300 CAPSULE ORAL at 06:30

## 2025-01-27 RX ADMIN — ROCURONIUM BROMIDE 50 MG: 10 INJECTION, SOLUTION INTRAVENOUS at 07:35

## 2025-01-27 RX ADMIN — DEXAMETHASONE SODIUM PHOSPHATE 4 MG: 4 INJECTION, SOLUTION INTRAMUSCULAR; INTRAVENOUS at 07:46

## 2025-01-27 RX ADMIN — ACETAMINOPHEN 975 MG: 325 TABLET, FILM COATED ORAL at 06:30

## 2025-01-27 RX ADMIN — PROPOFOL 30 MG: 10 INJECTION, EMULSION INTRAVENOUS at 08:05

## 2025-01-27 RX ADMIN — HEPARIN SODIUM 5000 UNITS: 5000 INJECTION INTRAVENOUS; SUBCUTANEOUS at 06:31

## 2025-01-27 RX ADMIN — SODIUM CHLORIDE, SODIUM LACTATE, POTASSIUM CHLORIDE, AND CALCIUM CHLORIDE: .6; .31; .03; .02 INJECTION, SOLUTION INTRAVENOUS at 07:29

## 2025-01-27 RX ADMIN — CEFAZOLIN 2 G: 330 INJECTION, POWDER, FOR SOLUTION INTRAMUSCULAR; INTRAVENOUS at 07:41

## 2025-01-27 RX ADMIN — FENTANYL CITRATE 50 MCG: 50 INJECTION, SOLUTION INTRAMUSCULAR; INTRAVENOUS at 07:47

## 2025-01-27 RX ADMIN — APREPITANT 40 MG: 40 CAPSULE ORAL at 07:22

## 2025-01-27 RX ADMIN — LIDOCAINE HYDROCHLORIDE 60 MG: 20 INJECTION, SOLUTION INFILTRATION; PERINEURAL at 07:35

## 2025-01-27 RX ADMIN — SUGAMMADEX 200 MG: 100 INJECTION, SOLUTION INTRAVENOUS at 09:04

## 2025-01-27 RX ADMIN — FENTANYL CITRATE 50 MCG: 50 INJECTION, SOLUTION INTRAMUSCULAR; INTRAVENOUS at 07:35

## 2025-01-27 RX ADMIN — PROPOFOL 140 MG: 10 INJECTION, EMULSION INTRAVENOUS at 07:35

## 2025-01-27 RX ADMIN — HYDROMORPHONE HYDROCHLORIDE 0.5 MG: 1 INJECTION, SOLUTION INTRAMUSCULAR; INTRAVENOUS; SUBCUTANEOUS at 09:19

## 2025-01-27 RX ADMIN — ONDANSETRON 4 MG: 2 INJECTION INTRAMUSCULAR; INTRAVENOUS at 08:22

## 2025-01-27 RX ADMIN — SCOPOLAMINE 1 PATCH: 1.5 PATCH, EXTENDED RELEASE TRANSDERMAL at 06:30

## 2025-01-27 SDOH — HEALTH STABILITY: MENTAL HEALTH: CURRENT SMOKER: 0

## 2025-01-27 ASSESSMENT — PAIN - FUNCTIONAL ASSESSMENT
PAIN_FUNCTIONAL_ASSESSMENT: 0-10
PAIN_FUNCTIONAL_ASSESSMENT: 0-10

## 2025-01-27 ASSESSMENT — PAIN SCALES - GENERAL
PAINLEVEL_OUTOF10: 3
PAINLEVEL_OUTOF10: 3
PAINLEVEL_OUTOF10: 0 - NO PAIN
PAINLEVEL_OUTOF10: 3
PAINLEVEL_OUTOF10: 8
PAINLEVEL_OUTOF10: 3
PAINLEVEL_OUTOF10: 4
PAINLEVEL_OUTOF10: 4
PAINLEVEL_OUTOF10: 8
PAINLEVEL_OUTOF10: 3
PAINLEVEL_OUTOF10: 3

## 2025-01-27 ASSESSMENT — PAIN DESCRIPTION - DESCRIPTORS: DESCRIPTORS: ACHING

## 2025-01-27 NOTE — ANESTHESIA PROCEDURE NOTES
Airway  Date/Time: 1/27/2025 7:38 AM  Urgency: elective    Airway not difficult    Staffing  Performed: PERRY   Authorized by: Renan Viveros MD    Performed by: Teresita Rodríguez  Patient location during procedure: OR    Indications and Patient Condition  Indications for airway management: anesthesia and airway protection  Spontaneous Ventilation: absent  Sedation level: deep  Preoxygenated: yes  Patient position: sniffing  MILS not maintained throughout  Mask difficulty assessment: 1 - vent by mask  Planned trial extubation    Final Airway Details  Final airway type: endotracheal airway      Successful airway: ETT  Cuffed: yes   Successful intubation technique: direct laryngoscopy  Facilitating devices/methods: intubating stylet and cricoid pressure  Blade: Felipe  Blade size: #4  ETT size (mm): 7.5  Cormack-Lehane Classification: grade I - full view of glottis  Placement verified by: capnometry   Measured from: lips  ETT to lips (cm): 22  Number of attempts at approach: 1  Ventilation between attempts: none  Number of other approaches attempted: 0

## 2025-01-27 NOTE — ANESTHESIA POSTPROCEDURE EVALUATION
Patient: Brian Ayon    Procedure Summary       Date: 01/27/25 Room / Location: [facility] 09 / [facility]    Anesthesia Start: 0729 Anesthesia Stop: 0911    Procedure: ROBOTIC ASSISTED BILATERAL INGUINAL HERNIA REPAIR (Bilateral) Diagnosis:       Right inguinal hernia      (Right inguinal hernia [K40.90])    Surgeons: Prashant Alicia MD PhD Responsible Provider: Renan Viveros MD    Anesthesia Type: general ASA Status: 3            Anesthesia Type: general    Vitals Value Taken Time   /96 01/27/25 0911   Temp 36.4 01/27/25 0911   Pulse 96 01/27/25 0911   Resp 20 01/27/25 0911   SpO2 94 01/27/25 0911       Anesthesia Post Evaluation    Patient participation: complete - patient participated  Level of consciousness: awake  Pain management: adequate  Airway patency: patent  Cardiovascular status: acceptable  Respiratory status: acceptable  Hydration status: acceptable  Postoperative Nausea and Vomiting: none        No notable events documented.

## 2025-01-27 NOTE — OP NOTE
ROBOTIC ASSISTED BILATERAL INGUINAL HERNIA REPAIR (B) Operative Note     Date: 2025  OR Location: [facility]    Name: Brian Ayon, : 1950, Age: 74 y.o., MRN: [id number], Sex: male    Diagnosis  Pre-op Diagnosis      * Right inguinal hernia [K40.90] Post-op Diagnosis     * Right inguinal hernia [K40.90]     Procedures  ROBOTIC ASSISTED BILATERAL INGUINAL HERNIA REPAIR  06465 - [facility] LAPAROSCOPY SURG RPR INITIAL INGUINAL HERNIA      Surgeons      * Prashant Alicia - Primary    Resident/Fellow/Other Assistant:  Surgeons and Role:  * No surgeons found with a matching role *    Staff:   Circulator: Paola Garber Person: Agnes  Surgical Assistant: Jo    Anesthesia Staff: Anesthesiologist: Renan Viveros MD  C-AA: OJEY Tanner    Procedure Summary  Anesthesia: General  ASA: III  Estimated Blood Loss: 20 mL  Intra-op Medications:   Administrations occurring from 0730 to 0940 on 25:   Medication Name Total Dose   BUPivacaine HCl (Marcaine) 0.5 % (5 mg/mL) injection 10 mL   sterile water irrigation solution 500 mL   ceFAZolin (Ancef) vial 1 g 2 g   dexAMETHasone (Decadron) injection 4 mg/mL 4 mg   fentaNYL (Sublimaze) injection 50 mcg/mL 100 mcg   ketorolac (Toradol) injection 30 mg 30 mg   lidocaine (Xylocaine) injection 2 % 60 mg   ondansetron (Zofran) 2 mg/mL injection 4 mg   propofol (Diprivan) injection 10 mg/mL 170 mg   rocuronium (ZeMuron) 50 mg/5 mL injection 70 mg              Anesthesia Record               Intraprocedure I/O Totals       None           Specimen:   ID Type Source Tests Collected by Time   1 : cord lipoma Tissue LIPOMA OF SPERMATIC CORD SURGICAL PATHOLOGY EXAM Prashant Alicia MD PhD 2025 0807                 Drains and/or Catheters: * None in log *    Tourniquet Times:         Implants:  Implants       Type Name Action Serial No.      Surgical Mesh Sling Implant MESH, PROGRIP LAP, 10 X 15 CM, [employer] - [id number] Implanted      Surgical Mesh Sling Implant MESH,  PROGRIP LAP, 10 X 15 CM, [facility] - [id number] Implanted               Findings:   Left small indirect and direct inguinal hernia with large cord lipoma, removed.  Right large indirect inguinal hernia with incarcerated contents and distortion of tortuous inferior epigastric pulled into defect.        Indications: Brian Ayon is an 74 y.o. male who is having surgery for Right inguinal hernia [K40.90].     The patient was seen in the preoperative area. The risks, benefits, complications, treatment options, non-operative alternatives, expected recovery and outcomes were discussed with the patient. The possibilities of reaction to medication, pulmonary aspiration, injury to surrounding structures, bleeding, recurrent infection, the need for additional procedures, failure to diagnose a condition, and creating a complication requiring transfusion or operation were discussed with the patient. The patient concurred with the proposed plan, giving informed consent.  The site of surgery was properly noted/marked if necessary per policy. The patient has been actively warmed in preoperative area. Preoperative antibiotics have been ordered and given within 1 hours of incision. Venous thrombosis prophylaxis have been ordered including bilateral sequential compression devices and chemical prophylaxis    Procedure Details: The patient was taken to the operating room. A presurgical huddle was completed which included the patient's identifiers, consent, procedure, and equipment. The patient was placed in the supine position on gel padded operating table and placed under general endotracheal anesthesia. All pressure points were padded. The patient was prepped and draped in the normal sterile fashion. IV prophylactic antibiotics were given prior to incision.     A surgical time out was performed prior to incision. A LUQ 8 mm robotic optiview trocar was used to gain entry into the peritoneal cavity. After entry insufflation was  performed to a pressure of 15 mmHg, and the abdomen was examined revealing no injuries. An 8mm robotic cannula was then inserted slightly off midline and an additional 8mm port was placed in the contralateral subcostal margin. The robot was then docked.     Upon exploring the pelvis, there was evidence bilateral inguinal hernias. On the left there were indirect and direct hernias, with colon stuck to the defects, as well as a large cord lipoma, and on the right there was a very large indirect inguinal hernia with distorted anatomy.    Attention was turned first to the left. An incision was made in the anterior peritoneal wall several inches cephalad to the visualized hernia. The peritoneum was peeled away from the myopectineal orifice and the hernia sac was fully reduced. The Vas Deferens was visualized and protected along with the gonadal vessels by bluntly peeling away the sac from these structures. The femoral canal was visualized and no hernia was appreciated. A large cord lipoma was removed in two pieces through a RUQ port. No hernia was present in any other spaces that were visualized. The dissection was continued out laterally peeling peritoneum away from the abdominal wall toward the anterior superior iliac spine to create room for a mesh. A piece of Medtronic ProGrip 10 cm x 15 cm mesh was placed to cover all potential hernia sites, centered on the visualized defect, and then the peritoneal flap was closed over the mesh using a 2-0 barbed absorbable suture in running fashion.     Next the contralateral side was addressed. An incision was made in the anterior peritoneal wall several inches cephalad to the visualized hernia. The peritoneum was peeled away from the myopectineal orifice and the hernia sac was fully reduced revealing a medial twisted inferior epigastric which was drawn into the defect. The Vas Deferens was visualized and protected along with the gonadal vessels by bluntly peeling away the sac  from these structures. The femoral canal was visualized and no hernia was appreciated. No hernia was present in any other spaces that were visualized. The dissection was continued out laterally peeling peritoneum away from the abdominal wall toward the anterior superior iliac spine to create room for a mesh. A piece of Medtronic ProGrip 10 cm x 15 cm mesh was placed to cover all potential hernia sites, centered on the visualized defect, which did not overlap in the midline, anchored lateral superior and inferior around the defect with 2-0 vicryl, and then the peritoneal flap was closed over the mesh using a 2-0 barbed absorbable suture in running fashion.     The cord lipomas were removed from the RUQ port with an endocatch bag and the port site was closed with 0 vicryl suture. A final sweep of the abdomen revealed excellent hemostasis and no foreign bodies within the peritoneal cavity.     All counts were correct. The abdomen was then desufflated and all ports were removed under direct visualization. The port sites were irrigated and closed with 4-0 monocryl subcutaneous suture, and covered with Dermabond skin adhesive. 10 cc of 0.5% Marcaine was administered to the port sites.     All surgical counts were again correct. The patient tolerated the procedure well and was transferred to the PACU in stable condition. I was present and scrubbed for the entire procedure.    Complications:  None; patient tolerated the procedure well.    Disposition: PACU - hemodynamically stable.  Condition: stable         Task Performed by RNFA or Surgical Assistant:  Camera, Instrument Exchange, Closure          Additional Details: None    Attending Attestation: I was present and scrubbed for the entire procedure.    Prashant Alicia  Phone Number: [phone]

## 2025-01-27 NOTE — ANESTHESIA PREPROCEDURE EVALUATION
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, left leg (CMS-HCC)      Liver   (+) Biliary sludge       Clinical information reviewed:   Tobacco  Allergies  Meds   Med Hx  Surg Hx   Fam Hx  Soc Hx        NPO Detail:  NPO/Void Status  Date of Last Liquid: 01/26/25  Time of Last Liquid: 2130  Date of Last Solid: 01/26/25  Time of Last Solid: 2130  Last Intake Type: Clear fluids         Physical Exam    Airway  Mallampati: II  TM distance: >3 FB  Neck ROM: full     Cardiovascular   Rhythm: regular  Rate: normal     Dental - normal exam  (+) upper dentures     Pulmonary    Abdominal        Anesthesia Plan    History of general anesthesia?: yes  History of complications of general anesthesia?: no    ASA 3     general     The patient is not a current smoker.  Patient was not previously instructed to abstain from smoking on day of procedure.  Patient did not smoke on day of procedure.    intravenous induction   Postoperative administration of opioids is intended.  Anesthetic plan and risks discussed with patient.  Use of blood products discussed with patient who.    Plan discussed with CAA.

## 2025-02-03 DIAGNOSIS — K83.8 BILIARY SLUDGE: ICD-10-CM

## 2025-02-03 LAB
LABORATORY COMMENT REPORT: NORMAL
PATH REPORT.FINAL DX SPEC: NORMAL
PATH REPORT.GROSS SPEC: NORMAL
PATH REPORT.RELEVANT HX SPEC: NORMAL
PATH REPORT.TOTAL CANCER: NORMAL

## 2025-02-03 NOTE — TELEPHONE ENCOUNTER
Patient called in stating he needs a refill on Zofran but is requesting disintegrating tablet.  Pended for approval.

## 2025-02-04 RX ORDER — ONDANSETRON 4 MG/1
4 TABLET, ORALLY DISINTEGRATING ORAL EVERY 8 HOURS PRN
Qty: 20 TABLET | Refills: 0 | Status: SHIPPED | OUTPATIENT
Start: 2025-02-04

## 2025-02-20 ENCOUNTER — APPOINTMENT (OUTPATIENT)
Dept: SURGERY | Facility: CLINIC | Age: 75
End: 2025-02-20
Payer: MEDICARE

## 2025-02-20 VITALS
SYSTOLIC BLOOD PRESSURE: 146 MMHG | DIASTOLIC BLOOD PRESSURE: 92 MMHG | OXYGEN SATURATION: 96 % | HEART RATE: 99 BPM | WEIGHT: 194.8 LBS | RESPIRATION RATE: 18 BRPM | TEMPERATURE: 97.7 F | BODY MASS INDEX: 27.89 KG/M2 | HEIGHT: 70 IN

## 2025-02-20 DIAGNOSIS — Z87.19 S/P BILATERAL INGUINAL HERNIA REPAIR: ICD-10-CM

## 2025-02-20 DIAGNOSIS — Z09 POSTOPERATIVE EXAMINATION: ICD-10-CM

## 2025-02-20 DIAGNOSIS — Z98.890 S/P BILATERAL INGUINAL HERNIA REPAIR: ICD-10-CM

## 2025-02-20 PROBLEM — K40.90 RIGHT INGUINAL HERNIA: Status: RESOLVED | Noted: 2025-01-09 | Resolved: 2025-02-20

## 2025-02-20 PROCEDURE — 1126F AMNT PAIN NOTED NONE PRSNT: CPT | Performed by: SURGERY

## 2025-02-20 PROCEDURE — 3077F SYST BP >= 140 MM HG: CPT | Performed by: SURGERY

## 2025-02-20 PROCEDURE — 1159F MED LIST DOCD IN RCRD: CPT | Performed by: SURGERY

## 2025-02-20 PROCEDURE — 1036F TOBACCO NON-USER: CPT | Performed by: SURGERY

## 2025-02-20 PROCEDURE — 3008F BODY MASS INDEX DOCD: CPT | Performed by: SURGERY

## 2025-02-20 PROCEDURE — 3080F DIAST BP >= 90 MM HG: CPT | Performed by: SURGERY

## 2025-02-20 PROCEDURE — 99024 POSTOP FOLLOW-UP VISIT: CPT | Performed by: SURGERY

## 2025-02-20 ASSESSMENT — PAIN SCALES - GENERAL: PAINLEVEL_OUTOF10: 0-NO PAIN

## 2025-02-20 NOTE — PROGRESS NOTES
"Subjective   The patient is status post Robotic Assisted Bilateral Inguinal Hernia Repair - Bilateral.  The patient is not having any pain. Patient is eating well, without constipation, and voiding without difficulty.    Objective   Physical Exam:  BP (!) 146/92 (BP Location: Right arm, Patient Position: Sitting, BP Cuff Size: Adult)   Pulse 99   Temp 36.5 °C (97.7 °F) (Temporal)   Resp 18   Ht 1.778 m (5' 10\")   Wt 88.4 kg (194 lb 12.8 oz)   SpO2 96%   BMI 27.95 kg/m²   Constitutional: alert, not in acute distress, well developed, and well nourished  Abdomen: soft, bowel sounds active, non-tender, no abnormal masses, no hernias noted  Incision(s): healing well, no drainage, no erythema, well approximated  Tenderness at incision site: none  Pathology: Cord lipoma    Assessment/Plan   Diagnoses and all orders for this visit:  S/P bilateral inguinal hernia repair  Postoperative examination    Small right sided seroma. Will resorb.  Call in two months if any issues.  "

## (undated) DEVICE — SYRINGE, 20 CC, LUER SLIP

## (undated) DEVICE — TUBE ENDOSCP COLON CHANNEL

## (undated) DEVICE — SUTURE, VICRYL, 3-0, 27 IN, SH

## (undated) DEVICE — Device

## (undated) DEVICE — DEVICE, OMNICLOSE, 10MM

## (undated) DEVICE — SLEEVE, VASO PRESS, CALF GARMENT, MEDIUM, GREEN

## (undated) DEVICE — TUBING SET, BIFURCATED, SMOKE EVAC, FILTERED, F/AIRSEAL

## (undated) DEVICE — MEDI-VAC NON-CONDUCTIVE SUCTION TUBING: Brand: CARDINAL HEALTH

## (undated) DEVICE — TROCAR, KII OPTICAL BLADELESS 5MM Z THREAD 100MM LNGTH

## (undated) DEVICE — Device: Brand: ENDO SMARTCAP

## (undated) DEVICE — 4-PORT MANIFOLD: Brand: NEPTUNE 2

## (undated) DEVICE — COVER, TIP HOT SHEARS ENDOWRIST

## (undated) DEVICE — SEAL, UNIVERSAL, 5-12MM

## (undated) DEVICE — ADAPTER FLSH PMP FLD MGMT GI IRRIG OFP 2 DISPOSABLE

## (undated) DEVICE — OBTURATOR, BLADELESS , SU

## (undated) DEVICE — TRAY, SURESTEP, SILICONE DRAINAGE BAG, STATLOCK, 16FR

## (undated) DEVICE — DRAPE, ARM XI

## (undated) DEVICE — DRESSING, ABDOMINAL, TENDERSORB, 8 X 7-1/2 IN, STERILE

## (undated) DEVICE — APPLICATOR, CHLORAPREP, W/ORANGE TINT, 26ML

## (undated) DEVICE — GOWN, SURGICAL, SMARTGOWN, XLARGE, STERILE

## (undated) DEVICE — CONNECTOR, 5 IN 1, STERILE

## (undated) DEVICE — CARE KIT, LAPAROSCOPIC, ADVANCED

## (undated) DEVICE — RETRIEVAL SYSTEM, MONARCH, 10MM DISP ENDOSCOPIC

## (undated) DEVICE — STAPLER,  ENDO ECHELON 45MM RELOAD, BLUE

## (undated) DEVICE — NEEDLE, EPIDURAL 17G X 4 1/2 PERIFIX

## (undated) DEVICE — DRAPE, SHEET, THREE QUARTER, FAN FOLD, 57 X 77 IN

## (undated) DEVICE — SCISSOR TIP, ENDOCUT, LAPAROSCOPIC

## (undated) DEVICE — SCISSORS, MONOPOLAR, CURVED, 8MM

## (undated) DEVICE — BANDAGE, GAUZE, CONFORMING, KERLIX, 6 PLY, 4.5 IN X 4.1 YD

## (undated) DEVICE — SOLUTION, IRRIGATION, STERILE WATER, 1000 ML, POUR BOTTLE

## (undated) DEVICE — GOWN,AURORA,NONRNF,XL,30/CS: Brand: MEDLINE

## (undated) DEVICE — SUTURE, MONOCRYL, 4-0, 27 IN, PS-2, UNDYED

## (undated) DEVICE — TOWEL, OR, XRAY DETECT 2 PK, GREEN, 17X26, ST

## (undated) DEVICE — CATHETER TRAY, SURESTEP, 16FR, URINE METER W/STATLOCK

## (undated) DEVICE — ADHESIVE, SKIN, DERMABOND ADVANCED, 15CM, PEN-STYLE

## (undated) DEVICE — SUTURE, VICRYL, 0, 27 IN, UR-6, VIOLET

## (undated) DEVICE — EVACUATOR, WOUND, SUCTION, CLOSED, JACKSON-PRATT, 100 CC, SILICONE

## (undated) DEVICE — HEMOSTAT, ABSORABABLE, SURGICEL SNOW, 2 X 4, LF

## (undated) DEVICE — CLIP, ENDO APPLIER LIGAMAX 5MM

## (undated) DEVICE — DRIVER, MEGA NEEDLE

## (undated) DEVICE — SCISSORS, METZ, CURVED, 3/4 BLADE

## (undated) DEVICE — DRAIN, CHANNEL, ROUND, FULL FLUTE 19F

## (undated) DEVICE — ELECTRODE, LAPAROSCOPY, L HOOK, 33CM, STERILE

## (undated) DEVICE — SOLUTION, IRRIGATION, SODIUM CHLORIDE 0.9%, 1000 ML, POUR BOTTLE

## (undated) DEVICE — GRASPER,FORCE, BIPOLAR, DAVINCI XI

## (undated) DEVICE — 2000CC GUARDIAN II: Brand: GUARDIAN

## (undated) DEVICE — LUBRICANT, ELECTROLUBE, F/ELECTRODE TIPS

## (undated) DEVICE — TOWEL PACK, STERILE, 4/PACK, BLUE

## (undated) DEVICE — ENDO CARRY-ON PROCEDURE KIT INCLUDES LUBRICANT, DEFENDO OLYMPUS AIR, WATER, SUCTION, BIOPSY VALVE KIT, ENZYMATIC SPONGE, AND BASIN.: Brand: ENDO CARRY-ON PROCEDURE KIT

## (undated) DEVICE — FORCEPS BX L240CM JAW DIA2.8MM L CAP W/ NDL MIC MESH TOOTH

## (undated) DEVICE — STAPLER, ECHELON 3000, 45MM STD

## (undated) DEVICE — TROCAR, OPTICAL, BLADELESS, 12MM, THREADED, 100MM LENGTH

## (undated) DEVICE — NEEDLE, INSUFFLATION, 13GAX120MM, DISP

## (undated) DEVICE — GRASPER TIP, FENESTRATED, DISP

## (undated) DEVICE — SYRINGE, 30 CC, LUER LOCK

## (undated) DEVICE — DRESSING, NON-ADHERENT, TELFA, OUCHLESS, 3 X 8 IN, STERILE

## (undated) DEVICE — CATHETER SCLERO L240CM NDL 25GA L4MM SHTH DIA2.3MM CNTRST

## (undated) DEVICE — GLOVE ORANGE PI 8 1/2   MSG9085

## (undated) DEVICE — SYRINGE, 10 CC, LUER LOCK

## (undated) DEVICE — STAPLER, ENDO ECHELON 45MM RELOAD, WHITE, REUSABLE

## (undated) DEVICE — GRASPER, LAPAROSCOPIC, DIRECT DRIVE, 5MM X35CM, DISP

## (undated) DEVICE — GOWN, ASTOUND, XL

## (undated) DEVICE — TRAYS TRANSPORT SCOPE OASIS W/LID

## (undated) DEVICE — GLOVE, SURGICAL, BIOGEL, 7.5, PF, LATEX, GREEN

## (undated) DEVICE — TRAP POLYP BALEEN

## (undated) DEVICE — TUBE SET 96 MM 64 MM H2O PERISTALTIC STD AUX CHANNEL

## (undated) DEVICE — HEMOSTAT, ARISTA, ABSORBABLE, 1 GRAMS

## (undated) DEVICE — SNARE ENDOSCP AD L240CM LOOP W10MM SHTH DIA2.4MM RND INSUL